# Patient Record
Sex: MALE | Race: WHITE | Employment: OTHER | URBAN - METROPOLITAN AREA
[De-identification: names, ages, dates, MRNs, and addresses within clinical notes are randomized per-mention and may not be internally consistent; named-entity substitution may affect disease eponyms.]

---

## 2018-08-21 ENCOUNTER — TELEPHONE (OUTPATIENT)
Dept: OTHER | Facility: CLINIC | Age: 33
End: 2018-08-21

## 2019-04-17 ENCOUNTER — OFFICE VISIT (OUTPATIENT)
Dept: FAMILY MEDICINE | Facility: CLINIC | Age: 34
End: 2019-04-17
Payer: COMMERCIAL

## 2019-04-17 VITALS
TEMPERATURE: 97.7 F | WEIGHT: 239 LBS | DIASTOLIC BLOOD PRESSURE: 84 MMHG | OXYGEN SATURATION: 97 % | SYSTOLIC BLOOD PRESSURE: 129 MMHG | BODY MASS INDEX: 32.37 KG/M2 | HEART RATE: 81 BPM | HEIGHT: 72 IN | RESPIRATION RATE: 16 BRPM

## 2019-04-17 DIAGNOSIS — Z76.89 ENCOUNTER TO ESTABLISH CARE: ICD-10-CM

## 2019-04-17 DIAGNOSIS — Z20.2 POTENTIAL EXPOSURE TO STD: Primary | ICD-10-CM

## 2019-04-17 RX ORDER — TACROLIMUS 1 MG/G
OINTMENT TOPICAL 2 TIMES DAILY
COMMUNITY
End: 2019-09-05

## 2019-04-17 SDOH — HEALTH STABILITY: MENTAL HEALTH: HOW OFTEN DO YOU HAVE 6 OR MORE DRINKS ON ONE OCCASION?: NEVER

## 2019-04-17 SDOH — HEALTH STABILITY: MENTAL HEALTH: HOW MANY DRINKS CONTAINING ALCOHOL DO YOU HAVE ON A TYPICAL DAY WHEN YOU ARE DRINKING?: 1 OR 2

## 2019-04-17 SDOH — HEALTH STABILITY: MENTAL HEALTH: HOW OFTEN DO YOU HAVE SIX OR MORE DRINKS ON ONE OCCASION?: NEVER

## 2019-04-17 SDOH — HEALTH STABILITY: MENTAL HEALTH: HOW OFTEN DO YOU HAVE A DRINK CONTAINING ALCOHOL?: MONTHLY OR LESS

## 2019-04-17 SDOH — HEALTH STABILITY: MENTAL HEALTH: HOW MANY STANDARD DRINKS CONTAINING ALCOHOL DO YOU HAVE ON A TYPICAL DAY?: 1 OR 2

## 2019-04-17 ASSESSMENT — ANXIETY QUESTIONNAIRES
1. FEELING NERVOUS, ANXIOUS, OR ON EDGE: NOT AT ALL
IF YOU CHECKED OFF ANY PROBLEMS ON THIS QUESTIONNAIRE, HOW DIFFICULT HAVE THESE PROBLEMS MADE IT FOR YOU TO DO YOUR WORK, TAKE CARE OF THINGS AT HOME, OR GET ALONG WITH OTHER PEOPLE: NOT DIFFICULT AT ALL
7. FEELING AFRAID AS IF SOMETHING AWFUL MIGHT HAPPEN: NOT AT ALL
GAD7 TOTAL SCORE: 0
3. WORRYING TOO MUCH ABOUT DIFFERENT THINGS: NOT AT ALL
6. BECOMING EASILY ANNOYED OR IRRITABLE: NOT AT ALL
2. NOT BEING ABLE TO STOP OR CONTROL WORRYING: NOT AT ALL
5. BEING SO RESTLESS THAT IT IS HARD TO SIT STILL: NOT AT ALL

## 2019-04-17 ASSESSMENT — PATIENT HEALTH QUESTIONNAIRE - PHQ9
5. POOR APPETITE OR OVEREATING: NOT AT ALL
SUM OF ALL RESPONSES TO PHQ QUESTIONS 1-9: 3

## 2019-04-17 ASSESSMENT — MIFFLIN-ST. JEOR: SCORE: 2063.93

## 2019-04-17 NOTE — NURSING NOTE
"33 year old  Chief Complaint   Patient presents with     Establish Care     Pt. presents to the clinic today to establish care / STD testing       Blood pressure 129/84, pulse 81, temperature 97.7  F (36.5  C), temperature source Oral, resp. rate 16, height 1.824 m (5' 11.8\"), weight 108.4 kg (239 lb), SpO2 97 %. Body mass index is 32.6 kg/m .  BP completed using cuff size:    There is no problem list on file for this patient.      Wt Readings from Last 2 Encounters:   04/17/19 108.4 kg (239 lb)     BP Readings from Last 3 Encounters:   04/17/19 129/84       Allergies   Allergen Reactions     Intal [Cromolyn]        Current Outpatient Medications   Medication     tacrolimus (PROTOPIC) 0.03 % external ointment     No current facility-administered medications for this visit.        Social History     Tobacco Use     Smoking status: Never Smoker     Smokeless tobacco: Never Used   Substance Use Topics     Alcohol use: Yes     Frequency: Monthly or less     Drinks per session: 1 or 2     Binge frequency: Never     Drug use: Not Currently     Types: Marijuana     Comment: very seldom       Honoring Choices - Health Care Directive Guide offered to patient at time of visit.    Health Maintenance Due   Topic Date Due     PREVENTIVE CARE VISIT  1985     HIV SCREEN (SYSTEM ASSIGNED)  10/22/2003     DTAP/TDAP/TD IMMUNIZATION (1 - Tdap) 10/22/2010     INFLUENZA VACCINE (1) 09/01/2018     PHQ-2  01/01/2019         There is no immunization history on file for this patient.    No results found for: PAP      No lab results found.    PHQ-2 ( 1999 Pfizer) 4/17/2019   Q1: Little interest or pleasure in doing things 0   Q2: Feeling down, depressed or hopeless 0   PHQ-2 Score 0       PHQ-9 SCORE 4/17/2019   PHQ-9 Total Score 3       IAN-7 SCORE 4/17/2019   Total Score 0       No flowsheet data found.    Rosanne Armendariz CMA  April 17, 2019 11:18 AM    "

## 2019-04-18 LAB
C TRACH DNA SPEC QL NAA+PROBE: NEGATIVE
HIV 1+2 AB+HIV1 P24 AG SERPL QL IA: NONREACTIVE
N GONORRHOEA DNA SPEC QL NAA+PROBE: NEGATIVE
SPECIMEN SOURCE: NORMAL
SPECIMEN SOURCE: NORMAL
T PALLIDUM AB SER QL: NONREACTIVE

## 2019-04-18 ASSESSMENT — ANXIETY QUESTIONNAIRES: GAD7 TOTAL SCORE: 0

## 2019-06-24 DIAGNOSIS — L98.9 SKIN LESION: Primary | ICD-10-CM

## 2019-09-04 ENCOUNTER — OFFICE VISIT (OUTPATIENT)
Dept: FAMILY MEDICINE | Facility: CLINIC | Age: 34
End: 2019-09-04
Payer: COMMERCIAL

## 2019-09-04 VITALS
RESPIRATION RATE: 16 BRPM | TEMPERATURE: 97.4 F | HEART RATE: 76 BPM | HEIGHT: 72 IN | WEIGHT: 246 LBS | OXYGEN SATURATION: 98 % | SYSTOLIC BLOOD PRESSURE: 141 MMHG | DIASTOLIC BLOOD PRESSURE: 84 MMHG | BODY MASS INDEX: 33.32 KG/M2

## 2019-09-04 DIAGNOSIS — L98.9 SKIN LESION: ICD-10-CM

## 2019-09-04 DIAGNOSIS — Z20.2 POTENTIAL EXPOSURE TO STD: Primary | ICD-10-CM

## 2019-09-04 ASSESSMENT — MIFFLIN-ST. JEOR: SCORE: 2098.85

## 2019-09-04 NOTE — NURSING NOTE
33 year old  Chief Complaint   Patient presents with     STD     Pt. presents to the clinic today requesting STD testing.        Blood pressure (!) 141/84, pulse 76, temperature 97.4  F (36.3  C), temperature source Oral, resp. rate 16, height 1.829 m (6'), weight 111.6 kg (246 lb), SpO2 98 %. Body mass index is 33.36 kg/m .  BP completed using cuff size:    There is no problem list on file for this patient.      Wt Readings from Last 2 Encounters:   09/04/19 111.6 kg (246 lb)   04/17/19 108.4 kg (239 lb)     BP Readings from Last 3 Encounters:   09/04/19 (!) 141/84   04/17/19 129/84       Allergies   Allergen Reactions     Intal [Cromolyn]        Current Outpatient Medications   Medication     tacrolimus (PROTOPIC) 0.1 % external ointment     No current facility-administered medications for this visit.        Social History     Tobacco Use     Smoking status: Never Smoker     Smokeless tobacco: Never Used   Substance Use Topics     Alcohol use: Yes     Frequency: Monthly or less     Drinks per session: 1 or 2     Binge frequency: Never     Comment: maybe twice alcohol     Drug use: Not Currently     Types: Marijuana     Comment: very seldom       Honoring Choices - Health Care Directive Guide offered to patient at time of visit.    Health Maintenance Due   Topic Date Due     PREVENTIVE CARE VISIT  1985     DTAP/TDAP/TD IMMUNIZATION (1 - Tdap) 10/22/2010     INFLUENZA VACCINE (1) 09/01/2019         There is no immunization history on file for this patient.    No results found for: PAP      No lab results found.    PHQ-2 ( 1999 Pfizer) 4/17/2019   Q1: Little interest or pleasure in doing things 0   Q2: Feeling down, depressed or hopeless 0   PHQ-2 Score 0       PHQ-9 SCORE 4/17/2019   PHQ-9 Total Score 3       IAN-7 SCORE 4/17/2019   Total Score 0       No flowsheet data found.    Rosanne Armendariz CMA  September 4, 2019 10:24 AM

## 2019-09-04 NOTE — PROGRESS NOTES
Moy Capone is a 33 year old male who presents today to have several skin lesions evaluated.  Moy has been traveling a lot over the past month, he noticed lesions on 2 fingers after this travel.  He did have a sexual encounter without penetration with a partner in [place].  This is someone who was unknown to him but he is still in contact with him.    Moy also was in a cooking contest in [place], cut his right index finger and then noticed a blister-type lesion as it was healing.  He was concerned about a virus or wart situation.  He also had a lesion on his left 4th finger that he was concerned about, that just popped up in the past week.  He has used an OTC wart remover product on these 2 places but also on his penis, where he has had tiny white spots, even before travel and potential exposure.    Review Of Systems   ROS: 10 point ROS neg other than the symptoms noted above in the HPI.    Past Medical History:   Diagnosis Date     Eczema      Past Surgical History:   Procedure Laterality Date     wisdom teeth       Social History     Socioeconomic History     Marital status: Single     Spouse name: Not on file     Number of children: Not on file     Years of education: Not on file     Highest education level: Not on file   Occupational History     Not on file   Social Needs     Financial resource strain: Not on file     Food insecurity:     Worry: Not on file     Inability: Not on file     Transportation needs:     Medical: Not on file     Non-medical: Not on file   Tobacco Use     Smoking status: Never Smoker     Smokeless tobacco: Never Used   Substance and Sexual Activity     Alcohol use: Yes     Frequency: Monthly or less     Drinks per session: 1 or 2     Binge frequency: Never     Comment: maybe twice alcohol     Drug use: Not Currently     Types: Marijuana     Comment: very seldom     Sexual activity: Yes     Partners: Male   Lifestyle     Physical activity:     Days per week: Not on file     Minutes per  session: Not on file     Stress: Not on file   Relationships     Social connections:     Talks on phone: Not on file     Gets together: Not on file     Attends Anabaptism service: Not on file     Active member of club or organization: Not on file     Attends meetings of clubs or organizations: Not on file     Relationship status: Not on file     Intimate partner violence:     Fear of current or ex partner: Not on file     Emotionally abused: Not on file     Physically abused: Not on file     Forced sexual activity: Not on file   Other Topics Concern     Not on file   Social History Narrative     Not on file     Family History   Problem Relation Age of Onset     Hypertension Father      Obesity Father      Alzheimer Disease Maternal Grandfather        BP (!) 141/84 (BP Location: Left arm, Patient Position: Chair, Cuff Size: Adult Large)   Pulse 76   Temp 97.4  F (36.3  C) (Oral)   Resp 16   Ht 1.829 m (6')   Wt 111.6 kg (246 lb)   SpO2 98%   BMI 33.36 kg/m      Exam:  Constitutional: healthy, alert and mild distress  Skin: Right index finger:  Small white lesion, tip of finger, after scraped with #15 blade, unable to see a wart or root.  Left 4th digit, area where OTC med left redness, no wart appearance.  Milia on penis.    Psychiatric: mentation appears normal and affect normal/bright    Assessment/Plan:  1. Potential exposure to STD    - HIV Antigen Antibody Combo  - Treponema Abs w Reflex to RPR and Titer  - NEISSERIA GONORRHOEA PCR  - CHLAMYDIA TRACHOMATIS PCR    2. Skin lesions    RTC in one week if any lesion appear abnormal after affect of OTC med done.

## 2019-09-05 DIAGNOSIS — L65.9 HAIR LOSS: Primary | ICD-10-CM

## 2019-09-05 RX ORDER — TACROLIMUS 1 MG/G
OINTMENT TOPICAL 2 TIMES DAILY
Qty: 60 G | Refills: 3 | Status: SHIPPED | OUTPATIENT
Start: 2019-09-05 | End: 2020-10-01

## 2019-09-27 ENCOUNTER — OFFICE VISIT (OUTPATIENT)
Dept: FAMILY MEDICINE | Facility: CLINIC | Age: 34
End: 2019-09-27
Payer: COMMERCIAL

## 2019-09-27 VITALS
HEIGHT: 72 IN | BODY MASS INDEX: 33.06 KG/M2 | WEIGHT: 244.1 LBS | OXYGEN SATURATION: 99 % | SYSTOLIC BLOOD PRESSURE: 125 MMHG | TEMPERATURE: 98.5 F | HEART RATE: 77 BPM | DIASTOLIC BLOOD PRESSURE: 84 MMHG

## 2019-09-27 DIAGNOSIS — B07.9 VIRAL WARTS, UNSPECIFIED TYPE: Primary | ICD-10-CM

## 2019-09-27 ASSESSMENT — PATIENT HEALTH QUESTIONNAIRE - PHQ9
SUM OF ALL RESPONSES TO PHQ QUESTIONS 1-9: 1
5. POOR APPETITE OR OVEREATING: NOT AT ALL

## 2019-09-27 ASSESSMENT — ENCOUNTER SYMPTOMS: FEVER: 0

## 2019-09-27 ASSESSMENT — ANXIETY QUESTIONNAIRES
5. BEING SO RESTLESS THAT IT IS HARD TO SIT STILL: NOT AT ALL
2. NOT BEING ABLE TO STOP OR CONTROL WORRYING: NOT AT ALL
3. WORRYING TOO MUCH ABOUT DIFFERENT THINGS: NOT AT ALL
GAD7 TOTAL SCORE: 0
7. FEELING AFRAID AS IF SOMETHING AWFUL MIGHT HAPPEN: NOT AT ALL
1. FEELING NERVOUS, ANXIOUS, OR ON EDGE: NOT AT ALL
IF YOU CHECKED OFF ANY PROBLEMS ON THIS QUESTIONNAIRE, HOW DIFFICULT HAVE THESE PROBLEMS MADE IT FOR YOU TO DO YOUR WORK, TAKE CARE OF THINGS AT HOME, OR GET ALONG WITH OTHER PEOPLE: NOT DIFFICULT AT ALL
6. BECOMING EASILY ANNOYED OR IRRITABLE: NOT AT ALL

## 2019-09-27 ASSESSMENT — MIFFLIN-ST. JEOR: SCORE: 2088.64

## 2019-09-27 NOTE — PATIENT INSTRUCTIONS
After Cryotherapy      The treated area will become red soon after your procedure. It also may blister and swell. If this happens, don t break open the blister.     You may also see clear drainage on the treated area. This is normal. The treated area will heal in about 7 to 10 days. It will probably not leave a scar.    Caring for yourself after cryotherapy    Starting the day after your procedure, wash the treated area gently with fragrance-free soap and water daily.    Leave the treated area uncovered. Ifyou have any drainainge, you can cover the area with a bandage (Band-Aid ).    If the treated area develops a crust, you can apply petroleum jelly (Vaseline ) on until the crust falls off.    If you have any bleeding, press firmly on the area with a clean gauze pad for 15 minutes. If the bleeding doesn t stop, repeat this step. If the bleeding still hasn t stopped after repeating this step, call your doctor s office.    Don t use scented soap, makeup, or lotion on the treated area until it has healed. This will usually be at least 10 days after your procedure.    You may lose some hair on the treated area. This depends on how deep the freezing went. The hair loss may be permanent.    Once the treated area has healed, apply a broad-spectrum sunscreen with an SPF of at least 30 to the area to protect it from scarring.    You may have discoloration (pinkness, redness, or lighter or darker skin) at the treated area for up to 1 year after your procedure. Some people may have it for even longer or it may be permanent.    Call Your Doctor or Nurse if You Have:    A temperature of 100.4  F (38  C) or higher    Chills    Any of the following symptoms at or around the treated area:    Redness or swelling that extends to areas of untreated skin    Increasing pain or discomfort in the treated area    Skin in the treated area that s hot or hard to the touch    Increasing oozing, or drainage (yellow or green) from the treated  area    A bad smell    Bleeding that doesn t stop after applying pressure    Any questions or concerns    Any problems you didn t expect

## 2019-09-27 NOTE — PROGRESS NOTES
"       MEMO       Moy Capone is a 33 year old male with a past medical history significant for eczema who presents for     Chief Complaint   Patient presents with     Derm Problem     Warts, noticed mid-August, used wart remover seemed to help,      Refill Request     Darion Winter presented to clinic about three weeks ago at which time he reported a three week history of skin lesion on his right index finger and left 4th finger. He was concerned about the possibility of wart or viral process and began using OTC liquid wart remover on the lesions twice daily for about 9-10 days. At the time of his office visit three weeks ago he was advised by the provider he saw to stop using the OTC wart remover and observe the lesions as they heal. The lesions have now fully healed and he suspects he still has a wart on his right index finger. He denies bleeding, drainage, or irritation. He has never previously have cryotherapy performed on the lesion. Although it is small, he would prefer to have the lesion on his right index finger treated today in clinic to avoid is growing or spreading to alternate areas.      Past Medical History:   Diagnosis Date     Eczema      Current Outpatient Medications   Medication     tacrolimus (PROTOPIC) 0.1 % external ointment     No current facility-administered medications for this visit.         Allergies   Allergen Reactions     Intal [Cromolyn]        Problem, Medication and Allergy Lists were reviewed and updated if needed..    Patient is an established patient of this clinic..         Review of Systems:   Review of Systems   Constitutional: Negative for fever.   Skin:        Possible wart on right index finger.          Physical Exam:     Vitals:    09/27/19 0934   BP: 125/84   Pulse: 77   Temp: 98.5  F (36.9  C)   TempSrc: Oral   SpO2: 99%   Weight: 110.7 kg (244 lb 1.6 oz)   Height: 1.826 m (5' 11.9\")     Body mass index is 33.2 kg/m .  Vital signs normal except elevated diastolic " blood pressure.      Physical Exam  Constitutional:       General: He is not in acute distress.     Appearance: Normal appearance. He is not ill-appearing.   Skin:     General: Skin is warm and dry.      Comments: One small somewhat raised verrucous lesion noted to right index finger, ~2 mm in diameter. Lesion is without bleeding, skin flaking, or drainage.    Neurological:      General: No focal deficit present.      Mental Status: He is alert and oriented to person, place, and time.   Psychiatric:         Mood and Affect: Mood normal.         Behavior: Behavior normal.         Results:     No laboratory testing was completed at today's visit.    Assessment and Plan      1. Viral warts, unspecified type  Comment: Pt with 1.5 month hx of verrucous lesion to right index finger. Appearance is improved after twice daily use of OTC liquid wart treatment x 9-10 days, however lesion remains present and pt is requesting to have cryotherapy performed in order to avoid future lesion growth or spread.  Plan:   - DESTRUCT BENIGN LESION, UP TO 14    Consent:  Risks and benefits of therapy discussed with patient who voices understanding and agrees with planned care. No barriers to communication or understanding identified.  Procedure: After obtaining verbal informed consent, cryotherapy with liquid nitrogen was applied, 4 treatments of 5 seconds each applied to both warts. Frost ring obtained. Pt tolerated the procedure well. Education:  Aftercare, including blister formation, risks of bleeding, and risks of recurrence were discussed. All questions answered.  Return for retreatment in 2 weeks.    Follow-up: Return to clinic if symptoms fail to improve, worsen, or new symptoms develop with the above treatment plan.      There are no discontinued medications.    Options for treatment and follow-up care were reviewed with the patient. Moy Capone  engaged in the decision making process and verbalized understanding of the options  discussed and agreed with the final plan.    FAITH Hurtado CNP

## 2019-09-27 NOTE — NURSING NOTE
"33 year old  Chief Complaint   Patient presents with     Derm Problem     Warts, noticed mid-August, used wart remover seemed to help,      Refill Request     Protopic       Blood pressure 125/84, pulse 77, temperature 98.5  F (36.9  C), temperature source Oral, height 1.826 m (5' 11.9\"), weight 110.7 kg (244 lb 1.6 oz), SpO2 99 %. Body mass index is 33.2 kg/m .  BP completed using cuff size:    There is no problem list on file for this patient.      Wt Readings from Last 2 Encounters:   09/27/19 110.7 kg (244 lb 1.6 oz)   09/04/19 111.6 kg (246 lb)     BP Readings from Last 3 Encounters:   09/27/19 125/84   09/04/19 (!) 141/84   04/17/19 129/84       Allergies   Allergen Reactions     Intal [Cromolyn]        Current Outpatient Medications   Medication     tacrolimus (PROTOPIC) 0.1 % external ointment     No current facility-administered medications for this visit.        Social History     Tobacco Use     Smoking status: Never Smoker     Smokeless tobacco: Never Used   Substance Use Topics     Alcohol use: Yes     Frequency: Monthly or less     Drinks per session: 1 or 2     Binge frequency: Never     Comment: maybe twice alcohol     Drug use: Not Currently     Types: Marijuana     Comment: very seldom       Honoring Choices - Health Care Directive Guide offered to patient at time of visit.    Health Maintenance Due   Topic Date Due     PREVENTIVE CARE VISIT  1985     DTAP/TDAP/TD IMMUNIZATION (1 - Tdap) 10/22/2010     INFLUENZA VACCINE (1) 09/01/2019         There is no immunization history on file for this patient.    No results found for: PAP      No lab results found.    PHQ-2 ( 1999 Pfizer) 4/17/2019   Q1: Little interest or pleasure in doing things 0   Q2: Feeling down, depressed or hopeless 0   PHQ-2 Score 0       PHQ-9 SCORE 4/17/2019 9/27/2019   PHQ-9 Total Score 3 1       IAN-7 SCORE 4/17/2019 9/27/2019   Total Score 0 0       No flowsheet data found.      Deanna Shields, [facility]  September 27, 2019 " 9:36 AM

## 2019-09-28 ASSESSMENT — ANXIETY QUESTIONNAIRES: GAD7 TOTAL SCORE: 0

## 2019-10-16 ENCOUNTER — TELEPHONE (OUTPATIENT)
Dept: PHARMACY | Facility: CLINIC | Age: 34
End: 2019-10-16

## 2019-10-16 NOTE — TELEPHONE ENCOUNTER
Called patient to follow up on patient's tacrolimus PA denial.    [place] from patient that he has tried about 5 different topical steroid products in the past for his excema which have nto worked as well as tacrolimus.  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    Will discuss with PA team about submitting an updated PA.

## 2019-10-21 NOTE — TELEPHONE ENCOUNTER
Central Prior Authorization Team   Phone: [phone]      Because it has already been denied. We could try to appeal with the updated information, with a Letter of Medical Necessity.

## 2019-11-01 ENCOUNTER — OFFICE VISIT (OUTPATIENT)
Dept: FAMILY MEDICINE | Facility: CLINIC | Age: 34
End: 2019-11-01
Payer: COMMERCIAL

## 2019-11-01 ENCOUNTER — MYC MEDICAL ADVICE (OUTPATIENT)
Dept: FAMILY MEDICINE | Facility: CLINIC | Age: 34
End: 2019-11-01

## 2019-11-01 VITALS
BODY MASS INDEX: 32.23 KG/M2 | HEART RATE: 77 BPM | RESPIRATION RATE: 16 BRPM | SYSTOLIC BLOOD PRESSURE: 125 MMHG | HEIGHT: 72 IN | TEMPERATURE: 98.7 F | OXYGEN SATURATION: 98 % | DIASTOLIC BLOOD PRESSURE: 84 MMHG | WEIGHT: 238 LBS

## 2019-11-01 DIAGNOSIS — Z11.3 ROUTINE SCREENING FOR STI (SEXUALLY TRANSMITTED INFECTION): ICD-10-CM

## 2019-11-01 DIAGNOSIS — N48.9 PENILE LESION: Primary | ICD-10-CM

## 2019-11-01 ASSESSMENT — ENCOUNTER SYMPTOMS
DIFFICULTY URINATING: 0
WOUND: 0
FREQUENCY: 0
DYSURIA: 0

## 2019-11-01 ASSESSMENT — MIFFLIN-ST. JEOR: SCORE: 2049.62

## 2019-11-01 NOTE — NURSING NOTE
"34 year old  Chief Complaint   Patient presents with     STD     Pt. presents to the clinic today for STI testing. Possible genital warts.        Blood pressure 125/84, pulse 77, temperature 98.7  F (37.1  C), temperature source Oral, resp. rate 16, height 1.816 m (5' 11.5\"), weight 108 kg (238 lb), SpO2 98 %. Body mass index is 32.73 kg/m .  BP completed using cuff size:    There is no problem list on file for this patient.      Wt Readings from Last 2 Encounters:   11/01/19 108 kg (238 lb)   09/27/19 110.7 kg (244 lb 1.6 oz)     BP Readings from Last 3 Encounters:   11/01/19 125/84   09/27/19 125/84   09/04/19 (!) 141/84       Allergies   Allergen Reactions     Intal [Cromolyn]        Current Outpatient Medications   Medication     tacrolimus (PROTOPIC) 0.1 % external ointment     No current facility-administered medications for this visit.        Social History     Tobacco Use     Smoking status: Never Smoker     Smokeless tobacco: Never Used   Substance Use Topics     Alcohol use: Yes     Frequency: Monthly or less     Drinks per session: 1 or 2     Binge frequency: Never     Comment: maybe twice alcohol     Drug use: Not Currently     Types: Marijuana     Comment: very seldom       Honoring Choices - Health Care Directive Guide offered to patient at time of visit.    Health Maintenance Due   Topic Date Due     PREVENTIVE CARE VISIT  1985     DTAP/TDAP/TD IMMUNIZATION (1 - Tdap) 10/22/2010     INFLUENZA VACCINE (1) 09/01/2019         There is no immunization history on file for this patient.    No results found for: PAP      No lab results found.    PHQ-2 ( 1999 Pfizer) 4/17/2019   Q1: Little interest or pleasure in doing things 0   Q2: Feeling down, depressed or hopeless 0   PHQ-2 Score 0       PHQ-9 SCORE 4/17/2019 9/27/2019   PHQ-9 Total Score 3 1       IAN-7 SCORE 4/17/2019 9/27/2019   Total Score 0 0       No flowsheet data found.    Rosanne Armendariz CMA  November 1, 2019 12:55 PM    "

## 2019-11-01 NOTE — PROGRESS NOTES
HPI       Moy Capone is a 34 year old male with a past medical history significant for eczema who presents for     Chief Complaint   Patient presents with     STD     Pt. presents to the clinic today for STI testing. Possible genital warts.      Moy reports a one day history of penile lesion. Reports he woke up in the middle of the night two nights ago and itched his groin. Upon itching, he noted a bump. He shot up out of bed and went to the bathroom to assess the bump. He noted two lesions at that time, one to the right of penile shaft base and one to the left. The following day he noted an additional bump on the left side. By the end of the day yesterday, both bumps on the left side had developed a dark spot on the top. He denies pain, itching, drainage, burning, or redness. Patient recently obtained a refill of tacrolimus, which he uses to manage his eczema. He admits to applying tacrolimus to the penile lesions once, didn't notice a major influence on his symptoms. He has tried no other therapies to manage his symptoms.     Moy was last screened for STI (gonorrhea, chlamydia, RPR, HIV) on 9/4/19, all results were negative. Since that time he has had two sexual partners, one of which was a new partner. He recently started dating a new partner but they have not yet been sexually active. The patient is only sexually active with men. Patient denies history of STI.       Past Medical History:   Diagnosis Date     Eczema      Current Outpatient Medications   Medication     tacrolimus (PROTOPIC) 0.1 % external ointment     No current facility-administered medications for this visit.         Allergies   Allergen Reactions     Intal [Cromolyn]        Problem, Medication and Allergy Lists were reviewed and updated if needed..    Patient is an established patient of this clinic..         Review of Systems:   Review of Systems   Genitourinary: Positive for genital sores. Negative for decreased urine volume,  "difficulty urinating, discharge, dysuria, frequency, penile pain, penile swelling, scrotal swelling, testicular pain and urgency.   Skin: Negative for wound.        Three bumps near penis.          Physical Exam:     Vitals:    11/01/19 1251   BP: 125/84   BP Location: Left arm   Patient Position: Chair   Cuff Size: Adult Large   Pulse: 77   Resp: 16   Temp: 98.7  F (37.1  C)   TempSrc: Oral   SpO2: 98%   Weight: 108 kg (238 lb)   Height: 1.816 m (5' 11.5\")     Body mass index is 32.73 kg/m .  Vitals were reviewed and were normal.     Physical Exam  Constitutional:       General: He is not in acute distress.     Appearance: Normal appearance. He is not ill-appearing or toxic-appearing.   Cardiovascular:      Rate and Rhythm: Normal rate.   Pulmonary:      Effort: Pulmonary effort is normal. No respiratory distress.   Genitourinary:      Skin:     General: Skin is warm and dry.   Neurological:      General: No focal deficit present.      Mental Status: He is alert and oriented to person, place, and time.   Psychiatric:         Mood and Affect: Mood normal.         Behavior: Behavior normal.           Results:     Laboratory results pending:  HIV Antigen Antibody Combo  Hepatitis C Antibody  Hepatitis B surface antigen  Hepatitis B surface antibody  Chlamydia trachomatis PCR  Neisseria gonorrhoea PCR  Treponema Abs e Reflex to RPR and Titer  HSV 1 and 2 DNA by PCR    Assessment and Plan      1. Penile lesion  Comment: Pt with 1 day hx of penile lesions. On exam, one of the penile lesions appears suspicious for HSV, culture obtained and is pending. The other three lesions appear as light pink papules without drainage, crusting, erythema, or swelling, one lesion with scabbing noted. Appearance of these lesions is potentially consistent with genital warts, however it is unclear at this time. Ddx include HSV-1/2, genital warts, folliculitis, dermatitis. Recommend awaiting HSV culture and monitoring appearance of all " lesions over the next 2-3 days. Given the quickly changing appearance of the lesions over the last 24 hours, recommend watchful waiting for 2-3 days prior to treating the lesions.   Plan:   - Hepatitis C antibody   - Hepatitis B Surface Antibody   - Hepatitis B surface antigen   - NEISSERIA GONORRHOEA PCR   - CHLAMYDIA TRACHOMATIS PCR   - Treponema Abs w Reflex to RPR and Titer   - HIV Antigen Antibody Combo   - HSV 1 and 2 DNA by PCR   - Check in with patient in 2-3 days    2. Routine screening for STI (sexually transmitted infection)  Comment: Pt last screened for STI (gonorrhea, chlamydia, RPR, HIV) on 9/4/19, all results were negative. Since that time he has had two sexual partners, one of which was a new partner. He recently started dating a new partner but they have not yet been sexually active. The patient is only sexually active with men. Patient denies history of STI.   Plan:   - Hepatitis C antibody   - Hepatitis B Surface Antibody   - Hepatitis B surface antigen   - NEISSERIA GONORRHOEA PCR   - CHLAMYDIA TRACHOMATIS PCR   - Treponema Abs w Reflex to RPR and Titer   - HIV Antigen Antibody Combo   - HSV 1 and 2 DNA by PCR      Follow-up: Lab results pending, will follow-up as results indicate. Plan to call patient to check in on his symptoms in 2-3 days. Will continue discussion surrounding a treatment plan at that time.       There are no discontinued medications.    Options for treatment and follow-up care were reviewed with the patient. Moy Capone  engaged in the decision making process and verbalized understanding of the options discussed and agreed with the final plan.    FAITH Hurtado CNP

## 2019-11-02 LAB
HSV1 DNA SPEC QL NAA+PROBE: POSITIVE
HSV2 DNA SPEC QL NAA+PROBE: NEGATIVE
SPECIMEN SOURCE: ABNORMAL
T PALLIDUM AB SER QL: NONREACTIVE

## 2019-11-03 LAB
C TRACH DNA SPEC QL NAA+PROBE: NEGATIVE
N GONORRHOEA DNA SPEC QL NAA+PROBE: NEGATIVE
SPECIMEN SOURCE: NORMAL
SPECIMEN SOURCE: NORMAL

## 2019-11-04 ENCOUNTER — TELEPHONE (OUTPATIENT)
Dept: FAMILY MEDICINE | Facility: CLINIC | Age: 34
End: 2019-11-04

## 2019-11-04 DIAGNOSIS — B00.9 HSV-1 (HERPES SIMPLEX VIRUS 1) INFECTION: Primary | ICD-10-CM

## 2019-11-04 LAB
HBV SURFACE AB SERPL IA-ACNC: 4.45 M[IU]/ML
HBV SURFACE AG SERPL QL IA: NONREACTIVE
HCV AB SERPL QL IA: NONREACTIVE
HIV 1+2 AB+HIV1 P24 AG SERPL QL IA: NONREACTIVE

## 2019-11-04 RX ORDER — VALACYCLOVIR HYDROCHLORIDE 500 MG/1
500 TABLET, FILM COATED ORAL 2 TIMES DAILY
Qty: 6 TABLET | Refills: 3 | Status: SHIPPED | OUTPATIENT
Start: 2019-11-04 | End: 2020-09-28

## 2019-11-04 RX ORDER — VALACYCLOVIR HYDROCHLORIDE 1 G/1
1000 TABLET, FILM COATED ORAL 2 TIMES DAILY
Qty: 20 TABLET | Refills: 0 | Status: SHIPPED | OUTPATIENT
Start: 2019-11-04 | End: 2020-09-28

## 2019-11-25 ENCOUNTER — OFFICE VISIT (OUTPATIENT)
Dept: DERMATOLOGY | Facility: CLINIC | Age: 34
End: 2019-11-25
Payer: COMMERCIAL

## 2019-11-25 VITALS — DIASTOLIC BLOOD PRESSURE: 83 MMHG | HEART RATE: 68 BPM | SYSTOLIC BLOOD PRESSURE: 135 MMHG

## 2019-11-25 DIAGNOSIS — L40.9 PSORIASIS: Primary | ICD-10-CM

## 2019-11-25 DIAGNOSIS — L21.9 DERMATITIS, SEBORRHEIC: ICD-10-CM

## 2019-11-25 RX ORDER — KETOCONAZOLE 20 MG/ML
SHAMPOO TOPICAL
Qty: 120 ML | Refills: 11 | Status: SHIPPED | OUTPATIENT
Start: 2019-11-25 | End: 2023-08-22

## 2019-11-25 RX ORDER — FLUOCINOLONE ACETONIDE 0.11 MG/ML
OIL TOPICAL
Qty: 118.28 ML | Refills: 6 | Status: SHIPPED | OUTPATIENT
Start: 2019-11-25 | End: 2023-08-22

## 2019-11-25 RX ORDER — FLUOCINONIDE TOPICAL SOLUTION USP, 0.05% 0.5 MG/ML
SOLUTION TOPICAL
Qty: 60 ML | Refills: 6 | Status: SHIPPED | OUTPATIENT
Start: 2019-11-25 | End: 2023-08-22

## 2019-11-25 RX ORDER — TRIAMCINOLONE ACETONIDE 1 MG/G
OINTMENT TOPICAL 2 TIMES DAILY
Qty: 80 G | Refills: 3 | Status: SHIPPED | OUTPATIENT
Start: 2019-11-25 | End: 2023-08-22

## 2019-11-25 ASSESSMENT — PAIN SCALES - GENERAL: PAINLEVEL: NO PAIN (0)

## 2019-11-25 NOTE — PROGRESS NOTES
[facility] Dermatology Note      Dermatology Problem List:  1. Psoriasiform dermatitis, scalp and body  - triamcinolone 0.1% ointment BID PRN body  - tacrolimus 0.1% ointment BID PRN face  - fluocinolone oil/fluocinonide solution to scalp BID PRN  - ketoconazole 2% shampoo 2-3 times weekly    Encounter Date: Nov 25, 2019    CC:   Chief Complaint   Patient presents with     Derm Problem     Moy is here today for possible eczema. He says his skin has changed a lot and the creams are no longer working.       History of Present Illness:  Mr. Moy Capone is a 34 year old male who presents as a referral from Indu Brady NP for eczema. He has a longstanding history of eczema for many years. Was initially diagnosed about 10 years when he was prescribed tacrolimus ointment. His disease was quite minimal during this time. Recently started worsening about 2 months ago. Developed itchiness and redness of scalp as well as scaliness of the face. He reports bumps on the elbows that are not itchy. He has some scattered red scaly spots on the back and legs. He is concerned that this might be psoriasis. No joint pain. He bathes every other day with Dove bar soap. He has been moisturizing from head to toe with cooking grade coconut oil. He is a swimmer. Otherwise, feels well and no other skin concerns.      Past Medical History:   Patient Active Problem List   Diagnosis     Dermatitis, seborrheic     Past Medical History:   Diagnosis Date     Eczema      Past Surgical History:   Procedure Laterality Date     wisdom teeth         Social History:  Patient reports that he has never smoked. He has never used smokeless tobacco. He reports current alcohol use. He reports previous drug use. Drug: Marijuana.    Family History:  Family History   Problem Relation Age of Onset     Hypertension Father      Obesity Father      Alzheimer Disease Maternal Grandfather      Skin Cancer Maternal Grandfather      Melanoma No  family hx of        Medications:  Current Outpatient Medications   Medication Sig Dispense Refill     Fluocinolone Acetonide Scalp 0.01 % OIL oil Apply thin layer to scalp 1-2 times daily as needed for itchiness 118.28 mL 6     fluocinonide (LIDEX) 0.05 % external solution Apply 4-6 drops to scalp 1-2 times daily as needed for itchiness 60 mL 6     ketoconazole (NIZORAL) 2 % external shampoo Lather, apply to scalp, leave in for 5 minutes, and then wash out 2-3 times a week 120 mL 11     tacrolimus (PROTOPIC) 0.1 % external ointment Apply topically 2 times daily 60 g 3     triamcinolone (KENALOG) 0.1 % external ointment Apply topically 2 times daily To itchy areas on body 80 g 3     valACYclovir (VALTREX) 1000 mg tablet Take 1 tablet (1,000 mg) by mouth 2 times daily 20 tablet 0     valACYclovir (VALTREX) 500 MG tablet Take 1 tablet (500 mg) by mouth 2 times daily for 3 days 6 tablet 3        Allergies   Allergen Reactions     Intal [Cromolyn]          Review of Systems:  -As per HPI  -Constitutional: Otherwise feeling well today, in usual state of health.  -HEENT: Patient denies nonhealing oral sores.  -Skin: As above in HPI. No additional skin concerns.    Physical exam:  Vitals: /83 (BP Location: Right arm, Patient Position: Sitting, Cuff Size: Adult Regular)   Pulse 68   GEN: This is a well developed, well-nourished male in no acute distress, in a pleasant mood.    SKIN: Total skin excluding the undergarment areas was performed. The exam included the head/face, neck, both arms, chest, back, abdomen, both legs, digits and/or nails.   -Cerna skin type: II  -Scalp with diffuse macular erythema throughout with mild scaling.  -Left flank, right thigh, and posterior calves with well-demarcated small circular to avoid dark red scaly plaques.   -Left sole with thickening and scaling with a fissure on the left great toe.  -No other lesions of concern on areas examined.     Impression/Plan:  1. Psoriasiform  dermatitis: Given the distribution of the rash on the elbows and scalp, we suspect that this is more likely psoriasiform in nature as opposed to eczematous. 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 We discussed that biopsy would more definitively give a diagnosis but patient declined at this time. Either way, treatment would include topical steroids and topical calcineurin inhibitors for symptomatic control.     Start triamcinolone 0.1% ointment BID PRN to body    Continue protopic 0.1% ointment BID PRN to face    Encouraged gentle skin care    2. 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    Follow-up in 3 months, earlier for new or changing lesions.     Dr. Matute staffed the patient.    Staff Involved:  Resident(Vickie Paez)/Staff(as above)    Vickie Paez M.D.  PGY-4 Resident  Dermatology  [facility]    Staff Physician Comments:   I saw and evaluated the patient with the resident and I edited the assessment and plan as documented in the note. I was present for the examination.    Mitchell Matute MD   of Dermatology  Department of Dermatology  [facility] [employer]

## 2019-11-25 NOTE — NURSING NOTE
Dermatology Rooming Note    Moy Capone's goals for this visit include:   Chief Complaint   Patient presents with     Derm Problem     Moy is here today for possible eczema. He says his skin has changed a lot and the creams are no longer working.     Maureen Gonzalez, [facility]

## 2019-11-25 NOTE — LETTER
11/25/2019       RE: Moy Capone  [address]  [facility] [address]     Dear Colleague,    Thank you for referring your patient, Moy Capone, to the [facility] DERMATOLOGY at [facility]. Please see a copy of my visit note below.    [facility] Dermatology Note      Dermatology Problem List:  1. Psoriasiform dermatitis, scalp and body  - triamcinolone 0.1% ointment BID PRN body  - tacrolimus 0.1% ointment BID PRN face  - fluocinolone oil/fluocinonide solution to scalp BID PRN  - ketoconazole 2% shampoo 2-3 times weekly    Encounter Date: Nov 25, 2019    CC:   Chief Complaint   Patient presents with     Derm Problem     Moy is here today for possible eczema. He says his skin has changed a lot and the creams are no longer working.       History of Present Illness:  Mr. Moy Capone is a 34 year old male who presents as a referral from Indu Brady NP for eczema. He has a longstanding history of eczema for many years. Was initially diagnosed about 10 years when he was prescribed tacrolimus ointment. His disease was quite minimal during this time. Recently started worsening about 2 months ago. Developed itchiness and redness of scalp as well as scaliness of the face. He reports bumps on the elbows that are not itchy. He has some scattered red scaly spots on the back and legs. He is concerned that this might be psoriasis. No joint pain. He bathes every other day with Dove bar soap. He has been moisturizing from head to toe with cooking grade coconut oil. He is a swimmer. Otherwise, feels well and no other skin concerns.      Past Medical History:   Patient Active Problem List   Diagnosis     Dermatitis, seborrheic     Past Medical History:   Diagnosis Date     Eczema      Past Surgical History:   Procedure Laterality Date     wisdom teeth         Social History:  Patient reports that he has never smoked. He has never used smokeless tobacco. He  reports current alcohol use. He reports previous drug use. Drug: Marijuana.    Family History:  Family History   Problem Relation Age of Onset     Hypertension Father      Obesity Father      Alzheimer Disease Maternal Grandfather      Skin Cancer Maternal Grandfather      Melanoma No family hx of        Medications:  Current Outpatient Medications   Medication Sig Dispense Refill     Fluocinolone Acetonide Scalp 0.01 % OIL oil Apply thin layer to scalp 1-2 times daily as needed for itchiness 118.28 mL 6     fluocinonide (LIDEX) 0.05 % external solution Apply 4-6 drops to scalp 1-2 times daily as needed for itchiness 60 mL 6     ketoconazole (NIZORAL) 2 % external shampoo Lather, apply to scalp, leave in for 5 minutes, and then wash out 2-3 times a week 120 mL 11     tacrolimus (PROTOPIC) 0.1 % external ointment Apply topically 2 times daily 60 g 3     triamcinolone (KENALOG) 0.1 % external ointment Apply topically 2 times daily To itchy areas on body 80 g 3     valACYclovir (VALTREX) 1000 mg tablet Take 1 tablet (1,000 mg) by mouth 2 times daily 20 tablet 0     valACYclovir (VALTREX) 500 MG tablet Take 1 tablet (500 mg) by mouth 2 times daily for 3 days 6 tablet 3        Allergies   Allergen Reactions     Intal [Cromolyn]          Review of Systems:  -As per HPI  -Constitutional: Otherwise feeling well today, in usual state of health.  -HEENT: Patient denies nonhealing oral sores.  -Skin: As above in HPI. No additional skin concerns.    Physical exam:  Vitals: /83 (BP Location: Right arm, Patient Position: Sitting, Cuff Size: Adult Regular)   Pulse 68   GEN: This is a well developed, well-nourished male in no acute distress, in a pleasant mood.    SKIN: Total skin excluding the undergarment areas was performed. The exam included the head/face, neck, both arms, chest, back, abdomen, both legs, digits and/or nails.   -Cerna skin type: II  -Scalp with diffuse macular erythema throughout with mild  scaling.  -Left flank, right thigh, and posterior calves with well-demarcated small circular to avoid dark red scaly plaques.   -Left sole with thickening and scaling with a fissure on the left great toe.  -No other lesions of concern on areas examined.     Impression/Plan:  1. Psoriasiform dermatitis: Given the distribution of the rash on the elbows and scalp, we suspect that this is more likely psoriasiform in nature as opposed to eczematous. 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 We discussed that biopsy would more definitively give a diagnosis but patient declined at this time. Either way, treatment would include topical steroids and topical calcineurin inhibitors for symptomatic control.     Start triamcinolone 0.1% ointment BID PRN to body    Continue protopic 0.1% ointment BID PRN to face    Encouraged gentle skin care    2. Sebopsoriasis vs seborrheic dermatitis, scalp    Start lidex 0.05% solution 1-2 times daily for itchiness to scalp    Start dermasmoothe oil 1-2 times daily for itchiness to scalp    Start ketoconazole 2% shampoo 2-3 times weekly to scalp      GUI Brady, DORINA  [address] [address] on close of this encounter.    Follow-up in 3 months, earlier for new or changing lesions.     Dr. Matute staffed the patient.    Staff Involved:  Resident(Vickie Paez)/Staff(as above)    Vickie Paez M.D.  PGY-4 Resident  Dermatology  [facility]    Staff Physician Comments:   I saw and evaluated the patient with the resident and I edited the assessment and plan as documented in the note. I was present for the examination.    Mitchell Matute MD   of Dermatology  Department of Dermatology  [facility] [employer]

## 2019-11-27 PROBLEM — L21.9 DERMATITIS, SEBORRHEIC: Status: ACTIVE | Noted: 2019-11-27

## 2020-01-22 DIAGNOSIS — Z11.3 ROUTINE SCREENING FOR STI (SEXUALLY TRANSMITTED INFECTION): Primary | ICD-10-CM

## 2020-01-22 DIAGNOSIS — Z20.2 POTENTIAL EXPOSURE TO STD: ICD-10-CM

## 2020-01-22 LAB
ALBUMIN SERPL-MCNC: 4.3 G/DL (ref 3.4–5)
ALP SERPL-CCNC: 60 U/L (ref 40–150)
ALT SERPL W P-5'-P-CCNC: 30 U/L (ref 0–70)
ANION GAP SERPL CALCULATED.3IONS-SCNC: 4 MMOL/L (ref 3–14)
AST SERPL W P-5'-P-CCNC: 41 U/L (ref 0–45)
BILIRUB SERPL-MCNC: 0.6 MG/DL (ref 0.2–1.3)
BUN SERPL-MCNC: 11 MG/DL (ref 7–30)
CALCIUM SERPL-MCNC: 8.6 MG/DL (ref 8.5–10.1)
CHLORIDE SERPL-SCNC: 106 MMOL/L (ref 94–109)
CO2 SERPL-SCNC: 29 MMOL/L (ref 20–32)
CREAT SERPL-MCNC: 1.01 MG/DL (ref 0.66–1.25)
GFR SERPL CREATININE-BSD FRML MDRD: >90 ML/MIN/{1.73_M2}
GLUCOSE SERPL-MCNC: 103 MG/DL (ref 70–99)
POTASSIUM SERPL-SCNC: 4.3 MMOL/L (ref 3.4–5.3)
PROT SERPL-MCNC: 8.2 G/DL (ref 6.8–8.8)
SODIUM SERPL-SCNC: 140 MMOL/L (ref 133–144)

## 2020-01-23 LAB
C TRACH DNA SPEC QL NAA+PROBE: NEGATIVE
HCV AB SERPL QL IA: NONREACTIVE
N GONORRHOEA DNA SPEC QL NAA+PROBE: NEGATIVE
SPECIMEN SOURCE: NORMAL
T PALLIDUM AB SER QL: NONREACTIVE

## 2020-02-14 ENCOUNTER — OFFICE VISIT (OUTPATIENT)
Dept: DERMATOLOGY | Facility: CLINIC | Age: 35
End: 2020-02-14
Payer: COMMERCIAL

## 2020-02-14 VITALS — SYSTOLIC BLOOD PRESSURE: 142 MMHG | DIASTOLIC BLOOD PRESSURE: 83 MMHG | HEART RATE: 61 BPM

## 2020-02-14 DIAGNOSIS — D22.9 MULTIPLE MELANOCYTIC NEVI: ICD-10-CM

## 2020-02-14 DIAGNOSIS — L21.9 DERMATITIS, SEBORRHEIC: ICD-10-CM

## 2020-02-14 DIAGNOSIS — L40.9 PSORIASIS: Primary | ICD-10-CM

## 2020-02-14 NOTE — NURSING NOTE
Dermatology Rooming Note    Moy Capone's goals for this visit include:   Chief Complaint   Patient presents with     Derm Problem     Psoriasis - Moy states he has not had any scalp issues since starting the medication shampoo and medication     Maureen Gonzalez, [facility]

## 2020-02-14 NOTE — LETTER
2/14/2020       RE: Moy Capone  [address]  [facility] [facility] [address]     Dear Colleague,    Thank you for referring your patient, Moy Capone, to the [facility] DERMATOLOGY at [facility]. Please see a copy of my visit note below.    [facility] Dermatology Note      Dermatology Problem List:  1. Psoriasi s, scalp and body  - triamcinolone 0.1% ointment BID PRN body  - tacrolimus 0.1% ointment BID PRN face  - fluocinolone oil/fluocinonide solution to scalp BID PRN  - ketoconazole 2% shampoo 2-3 times weekly    Encounter Date: Feb 14, 2020    CC:   Chief Complaint   Patient presents with     Derm Problem     Psoriasis - Moy states he has not had any scalp issues since starting the medication shampoo and medication       History of Present Illness:  Mr. Moy Capone is a 34 year old male who presents as a follow-up for psoriasiform dermatitis. The patient was last seen 11/25/19 when he was diagnosed with psoriasiform dermatitis. He was given ketoconazole shampoo, fluocinonide solution, and fluocinolone oil for his scalp. He reports complete clearance of the rash in his scalp. He reports overnight relief of redness and scale with the shampoo. He is currently using the ketoconazole shampoo once every 2 weeks, and the fluocinonide/fluocinolone once a week with good control.    He still has psoriasis plaques on his L posterior ankle, calves, hips/flanks, and elbows. The triamcinolone ointment worked on some areas and didn't help for others. He thinks the tacrolimus works best for his elbows and face. He hasn't noticed a difference in his skin since going to [place] on a training trip. He does not want to use any stronger medications for these areas.    He recently cut a mole while shaving on his face. He would like these checked and is wondering about removal options.    Pertinent Social History:  at the [facility] [employer]    Past  Medical History:   Patient Active Problem List   Diagnosis     Dermatitis, seborrheic     Past Medical History:   Diagnosis Date     Eczema      Past Surgical History:   Procedure Laterality Date     wisdom teeth         Social History:  Patient reports that he has never smoked. He has never used smokeless tobacco. He reports current alcohol use. He reports previous drug use. Drug: Marijuana.    Family History:  Family History   Problem Relation Age of Onset     Hypertension Father      Obesity Father      Alzheimer Disease Maternal Grandfather      Skin Cancer Maternal Grandfather      Melanoma No family hx of        Medications:  Current Outpatient Medications   Medication Sig Dispense Refill     dolutegravir (TIVICAY) 50 MG tablet Take 1 tablet (50 mg) by mouth daily 28 tablet 1     emtricitabine-tenofovir (TRUVADA) 200-300 MG per tablet Take 1 tablet by mouth daily 28 tablet 1     Fluocinolone Acetonide Scalp 0.01 % OIL oil Apply thin layer to scalp 1-2 times daily as needed for itchiness 118.28 mL 6     fluocinonide (LIDEX) 0.05 % external solution Apply 4-6 drops to scalp 1-2 times daily as needed for itchiness 60 mL 6     ketoconazole (NIZORAL) 2 % external shampoo Lather, apply to scalp, leave in for 5 minutes, and then wash out 2-3 times a week 120 mL 11     tacrolimus (PROTOPIC) 0.1 % external ointment Apply topically 2 times daily 60 g 3     triamcinolone (KENALOG) 0.1 % external ointment Apply topically 2 times daily To itchy areas on body 80 g 3     valACYclovir (VALTREX) 1000 mg tablet Take 1 tablet (1,000 mg) by mouth 2 times daily 20 tablet 0     valACYclovir (VALTREX) 500 MG tablet Take 1 tablet (500 mg) by mouth 2 times daily for 3 days 6 tablet 3        Allergies   Allergen Reactions     Intal [Cromolyn]          Review of Systems:  - As per HPI  - Constitutional: Otherwise feeling well today, in usual state of health.  - HEENT: Patient denies nonhealing oral sores.  - Skin: As above in HPI. No  additional skin concerns.    Physical exam:  Vitals: BP (!) 142/83 (BP Location: Right arm, Patient Position: Sitting, Cuff Size: Adult Large)   Pulse 61   GEN: This is a well developed, well-nourished male in no acute distress, in a pleasant mood.    SKIN: Focused examination of the face, neck, back, chest, abdomen, bilateral upper and lower extremities was performed.  - Cerna skin type: II-III  - Scattered discrete well defined scaly pink plaques on the calves, posterior ankles, flanks, hips, elbows, and back  - Mild erythema across the bridge of the nose and central face  - No erythema or scale noted in the scalp  - Few scattered light brown macules and papules on the R cheek, R jaw, and L nasal bridge  - No other lesions of concern on areas examined.     Impression/Plan:    1. Guttate psoriasis, well controlled  Symptoms appear to be well controlled with prn topical triamcinolone oint and protopic. Reassured patient that it is OK to use topical corticosteroids for longer periods of time if needed for thicker, recalcitrant plaques. Since disease is minimal today, there is no indication for new topical or systemic medications. Briefly discussed the potential benefits of bleach/chlorinated baths on psoriasis, though evidence is limited. Discussed the relationship between stress and weight on psoriatic flares. The patient reports losing 25 lbs recently since swimming more often.    Recommend dermasmoothe oil once daily to scaly plaques on the body    Continue triamcinolone 0.1% oint twice daily to body       Continue protopic 0.1% ointment BID PRN to face and elbows    Encouraged continued exercise    Will follow up as needed, due to good control of disease    Future considerations: nbUVB     2. Sebopsoriasis vs seborrheic dermatitis, scalp - resolved with prn topical regimen    Continue fluocinonide 0.05% solution and dermasmoothe oil to scalp once weekly    Continue ketoconazole 2% shampoo every 1-2 weeks  to scalp    3.   Multiple banal-appearing dermal nevi on the face    Discussed benign etiology, reassurance provided. No further treatment indicated    Discussed benefits and risks of cosmetic removal of these nevi, as the patient will have small scar in these areas if he opts for removal, with potential for recurrence. The patient does not want referral for cosmetic removal today, will think about it    Follow-up in 9 months for skin check and psoriasis follow up      Dr. Matute staffed the patient.    Joy Carranza MD  PGY-3 Medicine-Dermatology  Pager [phone]      Staff Involved:  Resident(Joy Carranza MD)/Staff(as above)    Staff Physician Comments:   I saw and evaluated the patient with the resident and I edited the assessment and plan as documented in the note. I was present for the examination.    Mitchell Matute MD   of Dermatology  Department of Dermatology  [facility] [employer]

## 2020-02-14 NOTE — PROGRESS NOTES
[facility] Dermatology Note      Dermatology Problem List:  1. Psoriasis, scalp and body  - triamcinolone 0.1% ointment BID PRN body  - tacrolimus 0.1% ointment BID PRN face  - fluocinolone oil/fluocinonide solution to scalp BID PRN  - ketoconazole 2% shampoo 2-3 times weekly    Encounter Date: Feb 14, 2020    CC:   Chief Complaint   Patient presents with     Derm Problem     Psoriasis - Moy states he has not had any scalp issues since starting the medication shampoo and medication       History of Present Illness:  Mr. Moy Capone is a 34 year old male who presents as a follow-up for psoriasiform dermatitis. The patient was last seen 11/25/19 when he was diagnosed with psoriasiform dermatitis. He was given ketoconazole shampoo, fluocinonide solution, and fluocinolone oil for his scalp. He reports complete clearance of the rash in his scalp. He reports overnight relief of redness and scale with the shampoo. He is currently using the ketoconazole shampoo once every 2 weeks, and the fluocinonide/fluocinolone once a week with good control.    He still has psoriasis plaques on his L posterior ankle, calves, hips/flanks, and elbows. The triamcinolone ointment worked on some areas and didn't help for others. He thinks the tacrolimus works best for his elbows and face. He hasn't noticed a difference in his skin since going to [place] on a training trip. He does not want to use any stronger medications for these areas.    He recently cut a mole while shaving on his face. He would like these checked and is wondering about removal options.    Pertinent Social History:  at the [facility] [employer] [facility]    Past Medical History:   Patient Active Problem List   Diagnosis     Dermatitis, seborrheic     Past Medical History:   Diagnosis Date     Eczema      Past Surgical History:   Procedure Laterality Date     wisdom teeth         Social History:  Patient reports that he has never smoked. He has  never used smokeless tobacco. He reports current alcohol use. He reports previous drug use. Drug: Marijuana.    Family History:  Family History   Problem Relation Age of Onset     Hypertension Father      Obesity Father      Alzheimer Disease Maternal Grandfather      Skin Cancer Maternal Grandfather      Melanoma No family hx of        Medications:  Current Outpatient Medications   Medication Sig Dispense Refill     dolutegravir (TIVICAY) 50 MG tablet Take 1 tablet (50 mg) by mouth daily 28 tablet 1     emtricitabine-tenofovir (TRUVADA) 200-300 MG per tablet Take 1 tablet by mouth daily 28 tablet 1     Fluocinolone Acetonide Scalp 0.01 % OIL oil Apply thin layer to scalp 1-2 times daily as needed for itchiness 118.28 mL 6     fluocinonide (LIDEX) 0.05 % external solution Apply 4-6 drops to scalp 1-2 times daily as needed for itchiness 60 mL 6     ketoconazole (NIZORAL) 2 % external shampoo Lather, apply to scalp, leave in for 5 minutes, and then wash out 2-3 times a week 120 mL 11     tacrolimus (PROTOPIC) 0.1 % external ointment Apply topically 2 times daily 60 g 3     triamcinolone (KENALOG) 0.1 % external ointment Apply topically 2 times daily To itchy areas on body 80 g 3     valACYclovir (VALTREX) 1000 mg tablet Take 1 tablet (1,000 mg) by mouth 2 times daily 20 tablet 0     valACYclovir (VALTREX) 500 MG tablet Take 1 tablet (500 mg) by mouth 2 times daily for 3 days 6 tablet 3        Allergies   Allergen Reactions     Intal [Cromolyn]          Review of Systems:  - As per HPI  - Constitutional: Otherwise feeling well today, in usual state of health.  - HEENT: Patient denies nonhealing oral sores.  - Skin: As above in HPI. No additional skin concerns.    Physical exam:  Vitals: BP (!) 142/83 (BP Location: Right arm, Patient Position: Sitting, Cuff Size: Adult Large)   Pulse 61   GEN: This is a well developed, well-nourished male in no acute distress, in a pleasant mood.    SKIN: Focused examination of the  face, neck, back, chest, abdomen, bilateral upper and lower extremities was performed.  - Cerna skin type: II-III  - Scattered discrete well defined scaly pink plaques on the calves, posterior ankles, flanks, hips, elbows, and back  - Mild erythema across the bridge of the nose and central face  - No erythema or scale noted in the scalp  - Few scattered light brown macules and papules on the R cheek, R jaw, and L nasal bridge  - No other lesions of concern on areas examined.     Impression/Plan:    1. Guttate psoriasis, well controlled  Symptoms appear to be well controlled with prn topical triamcinolone oint and protopic. Reassured patient that it is OK to use topical corticosteroids for longer periods of time if needed for thicker, recalcitrant plaques. Since disease is minimal today, there is no indication for new topical or systemic medications. Briefly discussed the potential benefits of bleach/chlorinated baths on psoriasis, though evidence is limited. Discussed the relationship between stress and weight on psoriatic flares. The patient reports losing 25 lbs recently since swimming more often.    Recommend dermasmoothe oil once daily to scaly plaques on the body    Continue triamcinolone 0.1% oint twice daily to body       Continue protopic 0.1% ointment BID PRN to face and elbows    Encouraged continued exercise    Will follow up as needed, due to good control of disease    Future considerations: nbUVB     2. Sebopsoriasis vs seborrheic dermatitis, scalp - resolved with prn topical regimen    Continue fluocinonide 0.05% solution and dermasmoothe oil to scalp once weekly    Continue ketoconazole 2% shampoo every 1-2 weeks to scalp    3.   Multiple banal-appearing dermal nevi on the face    Discussed benign etiology, reassurance provided. No further treatment indicated    Discussed benefits and risks of cosmetic removal of these nevi, as the patient will have small scar in these areas if he opts for  removal, with potential for recurrence. The patient does not want referral for cosmetic removal today, will think about it    Follow-up in 9 months for skin check and psoriasis follow up      Dr. Matute staffed the patient.    Joy Carranza MD  PGY-3 Medicine-Dermatology  Pager [phone]      Staff Involved:  Resident(Joy Carranza MD)/Staff(as above)    Staff Physician Comments:   I saw and evaluated the patient with the resident and I edited the assessment and plan as documented in the note. I was present for the examination.    Mitchell Matute MD   of Dermatology  Department of Dermatology  [facility]

## 2020-02-14 NOTE — PATIENT INSTRUCTIONS
Continue triamcinolone ointment twice daily as needed for thicker plaques. Can also try dermasmoothe oil daily to scaly areas. If you have a bad flare or think you need something stronger, please send us a message and we can send you a prescription    Continue ketoconazole shampoo every 2 weeks. Use dermasmoothe oil or lidex solution once weekly to scalp for dandruff

## 2020-02-24 ENCOUNTER — OFFICE VISIT (OUTPATIENT)
Dept: FAMILY MEDICINE | Facility: CLINIC | Age: 35
End: 2020-02-24
Payer: COMMERCIAL

## 2020-02-24 VITALS
DIASTOLIC BLOOD PRESSURE: 92 MMHG | HEART RATE: 73 BPM | RESPIRATION RATE: 16 BRPM | OXYGEN SATURATION: 99 % | SYSTOLIC BLOOD PRESSURE: 140 MMHG | TEMPERATURE: 97.9 F

## 2020-02-24 DIAGNOSIS — Z20.2 POTENTIAL EXPOSURE TO STD: Primary | ICD-10-CM

## 2020-02-24 DIAGNOSIS — J03.90 ACUTE TONSILLITIS, UNSPECIFIED ETIOLOGY: ICD-10-CM

## 2020-02-24 LAB
BASOPHILS # BLD AUTO: 0 10E9/L (ref 0–0.2)
BASOPHILS NFR BLD AUTO: 0.6 %
DIFFERENTIAL METHOD BLD: NORMAL
EOSINOPHIL # BLD AUTO: 0.1 10E9/L (ref 0–0.7)
EOSINOPHIL NFR BLD AUTO: 2.1 %
ERYTHROCYTE [DISTWIDTH] IN BLOOD BY AUTOMATED COUNT: 11.6 % (ref 10–15)
HCT VFR BLD AUTO: 45.8 % (ref 40–53)
HGB BLD-MCNC: 15.4 G/DL (ref 13.3–17.7)
IMM GRANULOCYTES # BLD: 0 10E9/L (ref 0–0.4)
IMM GRANULOCYTES NFR BLD: 0.2 %
LYMPHOCYTES # BLD AUTO: 2.3 10E9/L (ref 0.8–5.3)
LYMPHOCYTES NFR BLD AUTO: 42.9 %
MCH RBC QN AUTO: 28.8 PG (ref 26.5–33)
MCHC RBC AUTO-ENTMCNC: 33.6 G/DL (ref 31.5–36.5)
MCV RBC AUTO: 86 FL (ref 78–100)
MONOCYTES # BLD AUTO: 0.3 10E9/L (ref 0–1.3)
MONOCYTES NFR BLD AUTO: 4.8 %
NEUTROPHILS # BLD AUTO: 2.6 10E9/L (ref 1.6–8.3)
NEUTROPHILS NFR BLD AUTO: 49.4 %
NRBC # BLD AUTO: 0 10*3/UL
NRBC BLD AUTO-RTO: 0 /100
PLATELET # BLD AUTO: 283 10E9/L (ref 150–450)
RBC # BLD AUTO: 5.35 10E12/L (ref 4.4–5.9)
WBC # BLD AUTO: 5.2 10E9/L (ref 4–11)

## 2020-02-24 NOTE — NURSING NOTE
34 year old  Chief Complaint   Patient presents with     Consult     Pt. presents to the clinic today for a consult       Blood pressure (!) 140/92, pulse 73, temperature 97.9  F (36.6  C), temperature source Oral, resp. rate 16, SpO2 99 %. There is no height or weight on file to calculate BMI.  BP completed using cuff size:    Patient Active Problem List   Diagnosis     Dermatitis, seborrheic       Wt Readings from Last 2 Encounters:   11/01/19 108 kg (238 lb)   09/27/19 110.7 kg (244 lb 1.6 oz)     BP Readings from Last 3 Encounters:   02/24/20 (!) 140/92   02/14/20 (!) 142/83   11/25/19 135/83       Allergies   Allergen Reactions     Intal [Cromolyn]        Current Outpatient Medications   Medication     Fluocinolone Acetonide Scalp 0.01 % OIL oil     fluocinonide (LIDEX) 0.05 % external solution     ketoconazole (NIZORAL) 2 % external shampoo     tacrolimus (PROTOPIC) 0.1 % external ointment     triamcinolone (KENALOG) 0.1 % external ointment     valACYclovir (VALTREX) 1000 mg tablet     dolutegravir (TIVICAY) 50 MG tablet     emtricitabine-tenofovir (TRUVADA) 200-300 MG per tablet     valACYclovir (VALTREX) 500 MG tablet     No current facility-administered medications for this visit.        Social History     Tobacco Use     Smoking status: Never Smoker     Smokeless tobacco: Never Used   Substance Use Topics     Alcohol use: Yes     Frequency: Monthly or less     Drinks per session: 1 or 2     Binge frequency: Never     Comment: maybe twice alcohol     Drug use: Not Currently     Types: Marijuana     Comment: very seldom       Honoring Choices - Health Care Directive Guide offered to patient at time of visit.    Health Maintenance Due   Topic Date Due     PREVENTIVE CARE VISIT  1985     DTAP/TDAP/TD IMMUNIZATION (1 - Tdap) 10/22/1996     INFLUENZA VACCINE (1) 09/01/2019         There is no immunization history on file for this patient.    No results found for: PAP      Recent Labs   Lab Test  01/22/20  1308   ALT 30   CR 1.01   GFRESTIMATED >90   GFRESTBLACK >90   ALBUMIN 4.3   POTASSIUM 4.3       PHQ-2 ( 1999 Pfizer) 2/14/2020 4/17/2019   Q1: Little interest or pleasure in doing things 0 0   Q2: Feeling down, depressed or hopeless 0 0   PHQ-2 Score 0 0       PHQ-9 SCORE 4/17/2019 9/27/2019   PHQ-9 Total Score 3 1       IAN-7 SCORE 4/17/2019 9/27/2019   Total Score 0 0       No flowsheet data found.    Rosanne Armendariz CMA  February 24, 2020 11:40 AM

## 2020-02-25 ENCOUNTER — MYC MEDICAL ADVICE (OUTPATIENT)
Dept: FAMILY MEDICINE | Facility: CLINIC | Age: 35
End: 2020-02-25

## 2020-02-26 LAB — HIV 1+2 AB+HIV1 P24 AG SERPL QL IA: NONREACTIVE

## 2020-03-11 ENCOUNTER — HEALTH MAINTENANCE LETTER (OUTPATIENT)
Age: 35
End: 2020-03-11

## 2020-03-19 NOTE — PROGRESS NOTES
"Moy Capone is a 34 year old male who presents today with fever, chills, fatigue.  No joint or body aches, his appetite is less but he attributes this to having a sore throat that comes and goes.  He feels that \"one tonsil\" hurts more than the other.  He is using listerine and salt water rinses. All symptoms have been for at least a week.    Moy is also back for his follow up HIV status.  He had an incident 6 weeks ago where he may have been exposed to HIV.  He has been waiting to get his testing and is very anxious about it.     Review Of Systems   ROS: 10 point ROS neg other than the symptoms noted above in the HPI.    Past Medical History:   Diagnosis Date     Eczema      Past Surgical History:   Procedure Laterality Date     wisdom teeth       Social History     Socioeconomic History     Marital status: Single     Spouse name: Not on file     Number of children: Not on file     Years of education: Not on file     Highest education level: Not on file   Occupational History     Not on file   Social Needs     Financial resource strain: Not on file     Food insecurity     Worry: Not on file     Inability: Not on file     Transportation needs     Medical: Not on file     Non-medical: Not on file   Tobacco Use     Smoking status: Never Smoker     Smokeless tobacco: Never Used   Substance and Sexual Activity     Alcohol use: Yes     Frequency: Monthly or less     Drinks per session: 1 or 2     Binge frequency: Never     Comment: maybe twice alcohol     Drug use: Not Currently     Types: Marijuana     Comment: very seldom     Sexual activity: Yes     Partners: Male   Lifestyle     Physical activity     Days per week: Not on file     Minutes per session: Not on file     Stress: Not on file   Relationships     Social connections     Talks on phone: Not on file     Gets together: Not on file     Attends Orthodox service: Not on file     Active member of club or organization: Not on file     Attends meetings of clubs or " organizations: Not on file     Relationship status: Not on file     Intimate partner violence     Fear of current or ex partner: Not on file     Emotionally abused: Not on file     Physically abused: Not on file     Forced sexual activity: Not on file   Other Topics Concern     Parent/sibling w/ CABG, MI or angioplasty before 65F 55M? Not Asked   Social History Narrative     Not on file     Family History   Problem Relation Age of Onset     Hypertension Father      Obesity Father      Alzheimer Disease Maternal Grandfather      Skin Cancer Maternal Grandfather      Melanoma No family hx of        BP (!) 140/92 (BP Location: Left arm, Patient Position: Chair, Cuff Size: Adult Regular)   Pulse 73   Temp 97.9  F (36.6  C) (Oral)   Resp 16   SpO2 99%     Exam:  Constitutional: healthy, alert and moderate distress  Head: Normocephalic. No masses, lesions, tenderness or abnormalities  Neck: Neck supple. No adenopathy. Thyroid symmetric, normal size,, Carotids without bruits.  ENT: pharynx erythematous without exudate  Cardiovascular: negative, PMI normal. No lifts, heaves, or thrills. RRR. No murmurs, clicks gallops or rub  Respiratory: negative, Percussion normal. Good diaphragmatic excursion. Lungs clear  Psychiatric: mentation appears normal, anxious, concentration poor and crying- very worried about HIV status  Hematologic/Lymphatic/Immunologic: Normal cervical lymph nodes    Assessment/Plan:  1. Potential exposure to STD    - HIV Antigen Antibody Combo  I will notify Moy per text (his preferred method) as soon as the results are back.  2. Acute tonsillitis, unspecified etiology    - CBC with platelets differential  Symptomatic treatment, fluids, rest  Return to clinic in one week if symptoms persist    Options for treatment and follow-up care were reviewed with the patient. Patient engaged in the decision making process and verbalized understanding of the options discussed and agreed with the final plan.

## 2020-07-13 ENCOUNTER — VIRTUAL VISIT (OUTPATIENT)
Dept: FAMILY MEDICINE | Facility: CLINIC | Age: 35
End: 2020-07-13
Payer: COMMERCIAL

## 2020-07-13 DIAGNOSIS — Z20.2 POTENTIAL EXPOSURE TO STD: Primary | ICD-10-CM

## 2020-07-13 NOTE — PROGRESS NOTES
"Moy Capone is a 34 year old male who is being evaluated via a billable video visit.      The patient has been notified of following:     \"This video visit will be conducted via a call between you and your physician/provider. We have found that certain health care needs can be provided without the need for an in-person physical exam.  This service lets us provide the care you need with a video conversation.  If a prescription is necessary we can send it directly to your pharmacy.  If lab work is needed we can place an order for that and you can then stop by our lab to have the test done at a later time.    Video visits are billed at different rates depending on your insurance coverage.  Please reach out to your insurance provider with any questions.    If during the course of the call the physician/provider feels a video visit is not appropriate, you will not be charged for this service.\"    Patient has given verbal consent for Video visit? Yes  How would you like to obtain your AVS? Walter  Patient would like the video invitation sent by: Walter  Will anyone else be joining your video visit? No    Subjective     Moy Capone is a 34 year old male who presents today via video visit for the following health issues: Moy is in a new relationship and has had intercourse, starting on June 6th.  His partner was contacted by [facility] that his old partner had tested positive for syphilis.  Neither Moy or his partner have any symptoms of any concern at all, Moy wants to be tested for syphilis and for gonorrhea and chlamydia as well.    Moy is a  at the [employer] and his team has just been delayed from coming to campus for 2 weeks.  He is feeling confident that things will be good for him and his job, he is exercising at home and feels very good about that.    Video Start Time: 1258    Review of Systems   CONSTITUTIONAL: NEGATIVE for fever, chills, change in weight  ENT/MOUTH: NEGATIVE for " "ear, mouth and throat problems  RESP: NEGATIVE for significant cough or SOB  CV: NEGATIVE for chest pain, palpitations or peripheral edema  : negative for dysuria, hematuria, decreased urinary stream, erectile dysfunction      Objective      Physical Exam     GENERAL: Healthy, alert and no distress  EYES: Eyes grossly normal to inspection.  No discharge or erythema, or obvious scleral/conjunctival abnormalities.  RESP: No audible wheeze, cough, or visible cyanosis.  No visible retractions or increased work of breathing.    SKIN: Visible skin clear. No significant rash, abnormal pigmentation or lesions.  NEURO: Cranial nerves grossly intact.  Mentation and speech appropriate for age.  PSYCH: Mentation appears normal, affect normal/bright, judgement and insight intact, normal speech and appearance well-groomed.    Assessment & Plan     1. Potential exposure to STD  - NEISSERIA GONORRHOEA PCR; Future  - CHLAMYDIA TRACHOMATIS PCR; Future  - Treponema Abs w Reflex to RPR and Titer; Future    Moy will call to make a lab only appointment, then he and I will go over his results by phone when they are back.   BMI:   Estimated body mass index is 32.73 kg/m  as calculated from the following:    Height as of 11/1/19: 1.816 m (5' 11.5\").    Weight as of 11/1/19: 108 kg (238 lb).    Indu Brady NP   [facility]      Video-Visit Details    Type of service:  Video Visit    Video End Time: 1319    Originating Location (pt. Location): Home    Distant Location (provider location):   [facility]     Platform used for Video Visit: EasyPaint    No follow-ups on file.       Indu Brady NP        "

## 2020-07-15 DIAGNOSIS — Z20.2 POTENTIAL EXPOSURE TO STD: ICD-10-CM

## 2020-07-16 LAB
C TRACH DNA SPEC QL NAA+PROBE: NEGATIVE
N GONORRHOEA DNA SPEC QL NAA+PROBE: NEGATIVE
SPECIMEN SOURCE: NORMAL
SPECIMEN SOURCE: NORMAL
T PALLIDUM AB SER QL: NONREACTIVE

## 2020-07-27 DIAGNOSIS — L40.9 PSORIASIS: Primary | ICD-10-CM

## 2020-09-27 DIAGNOSIS — L65.9 HAIR LOSS: ICD-10-CM

## 2020-09-28 ENCOUNTER — OFFICE VISIT (OUTPATIENT)
Dept: DERMATOLOGY | Facility: CLINIC | Age: 35
End: 2020-09-28
Payer: COMMERCIAL

## 2020-09-28 DIAGNOSIS — D22.9 MULTIPLE MELANOCYTIC NEVI: Primary | ICD-10-CM

## 2020-09-28 DIAGNOSIS — D23.9 DERMATOFIBROMA: ICD-10-CM

## 2020-09-28 DIAGNOSIS — L40.9 PSORIASIS: ICD-10-CM

## 2020-09-28 ASSESSMENT — PAIN SCALES - GENERAL: PAINLEVEL: NO PAIN (0)

## 2020-09-28 NOTE — LETTER
"9/28/2020       RE: Moy Capone  [address]  [place] [address]     Dear Colleague,    Thank you for referring your patient, Moy Capone, to the [facility]ERMATOLOGY at [facility]. Please see a copy of my visit note below.    [facility] Dermatology Note      Dermatology Problem List:  1. Psoriasis, scalp and body  - triamcinolone 0.1% ointment BID PRN body  - tacrolimus 0.1% ointment BID PRN face  - fluocinolone oil/fluocinonide solution to scalp BID PRN  - ketoconazole 2% shampoo 2-3 times weekly       CC:   Chief Complaint   Patient presents with     Derm Problem     Moy is here today for a full body skin check. He states \" I have a lot of moles and a family history of skin cancer\"         Encounter Date: Sep 28, 2020    History of Present Illness:  Mr. Moy Capone is a 34 year old male who presents for follow up.    Overall psoriasis has been doing very well - only small amount on the elbows with redness and scaling   - scalp has been doing well - no itching, redness, or dandruff    A few moles of concern on legs - purple/brown, sometimes itchy    Has mole on the bridge of the nose - has increased over time but asymptomatic    Past Medical History:   Past Medical History:   Diagnosis Date     Eczema      Past Surgical History:   Procedure Laterality Date     wisdom teeth         Social History:   reports that he has never smoked. He has never used smokeless tobacco. He reports current alcohol use. He reports previous drug use. Drug: Marijuana.    Family History:  Family History   Problem Relation Age of Onset     Hypertension Father      Obesity Father      Alzheimer Disease Maternal Grandfather      Skin Cancer Maternal Grandfather      Melanoma No family hx of        Medications:  Current Outpatient Medications   Medication Sig Dispense Refill     Fluocinolone Acetonide Scalp 0.01 % OIL oil Apply thin layer to scalp 1-2 times daily " as needed for itchiness 118.28 mL 6     fluocinonide (LIDEX) 0.05 % external solution Apply 4-6 drops to scalp 1-2 times daily as needed for itchiness 60 mL 6     Humidifier MISC Use humidifer (medical supply) daily to increase ambient environmental humidity for symptomatic control of psoriasis 1 each 11     ketoconazole (NIZORAL) 2 % external shampoo Lather, apply to scalp, leave in for 5 minutes, and then wash out 2-3 times a week 120 mL 11     tacrolimus (PROTOPIC) 0.1 % external ointment Apply topically 2 times daily 60 g 3     dolutegravir (TIVICAY) 50 MG tablet Take 1 tablet (50 mg) by mouth daily (Patient not taking: Reported on 2/24/2020) 28 tablet 1     doxycycline hyclate (VIBRAMYCIN) 100 MG capsule Take 1 capsule (100 mg) by mouth 2 times daily 20 capsule 0     emtricitabine-tenofovir (TRUVADA) 200-300 MG per tablet Take 1 tablet by mouth daily (Patient not taking: Reported on 2/24/2020) 28 tablet 1     triamcinolone (KENALOG) 0.1 % external ointment Apply topically 2 times daily To itchy areas on body (Patient not taking: Reported on 7/13/2020) 80 g 3     valACYclovir (VALTREX) 1000 mg tablet Take 1 tablet (1,000 mg) by mouth 2 times daily 20 tablet 0     valACYclovir (VALTREX) 500 MG tablet Take 1 tablet (500 mg) by mouth 2 times daily for 3 days (Patient not taking: Reported on 7/13/2020) 6 tablet 3     Allergies   Allergen Reactions     Intal [Cromolyn]          Review of Systems:  - As per HPI  - Constitutional: The patient denies fatigue, fevers, chills, unintended weight loss, and night sweats.  - Skin: As above in HPI. No additional skin concerns.    Physical exam:  Vitals: There were no vitals taken for this visit.  GEN: This is a well developed, well-nourished male in no acute distress, in a pleasant mood.   SKIN: Cerna phototype III  - Full skin, which includes the head/face, both arms, chest, back, abdomen,both legs, genitalia and/or groin buttocks, digits and/or nails, was examined.  -  erythematous scaly papules and confluent plaques on the extensor elbows  - slight scaling on the occipital scalp  - erythematous/hyperpigmented papules on the left shin and right lateral popliteal fossa with positive dimple sign  - No other lesions of concern on areas examined.     Impression/Plan:  1. Psoriasis: mild today with minimal activity on the elbows. Continue tacrolimus ointment as needed. Scalp is doing well overall.    Continue tacrolimus 0.1% ointment    2. Dermatofibromata: on lower legs; reassurance provided.     3. Melanocytic nevi on the bridge of nose and face, trunk, and extremities.    Reassurance provided. Discussed sun protective behaviors including OTC spf 30+ sunscreen use and sun avoidance strategies.    Follow-up in 1 year, earlier for new or changing lesions.       Staff Involved:  Staff Only    Mitchell Matute MD   of Dermatology  Department of Dermatology  [facility]

## 2020-09-28 NOTE — NURSING NOTE
"Chief Complaint   Patient presents with     Derm Problem     Moy is here today for a full body skin check. He states \" I have a lot of moles and a family history of skin cancer\"     Raquel Thorpe, RMA  "

## 2020-10-01 RX ORDER — TACROLIMUS 1 MG/G
OINTMENT TOPICAL
Qty: 60 G | Refills: 2 | Status: SHIPPED | OUTPATIENT
Start: 2020-10-01

## 2020-11-03 ENCOUNTER — HOSPITAL ENCOUNTER (EMERGENCY)
Facility: CLINIC | Age: 35
Discharge: HOME | End: 2020-11-03
Attending: EMERGENCY MEDICINE | Admitting: EMERGENCY MEDICINE
Payer: COMMERCIAL

## 2020-11-03 VITALS
TEMPERATURE: 98.9 F | DIASTOLIC BLOOD PRESSURE: 95 MMHG | BODY MASS INDEX: 32.8 KG/M2 | HEART RATE: 93 BPM | WEIGHT: 238.5 LBS | RESPIRATION RATE: 16 BRPM | SYSTOLIC BLOOD PRESSURE: 162 MMHG | OXYGEN SATURATION: 96 %

## 2020-11-03 DIAGNOSIS — L03.90 CELLULITIS, UNSPECIFIED CELLULITIS SITE: ICD-10-CM

## 2020-11-03 PROCEDURE — 99282 EMERGENCY DEPT VISIT SF MDM: CPT | Performed by: EMERGENCY MEDICINE

## 2020-11-03 PROCEDURE — 99284 EMERGENCY DEPT VISIT MOD MDM: CPT | Performed by: EMERGENCY MEDICINE

## 2020-11-03 RX ORDER — CIPROFLOXACIN 500 MG/1
500 TABLET, FILM COATED ORAL 2 TIMES DAILY
Qty: 14 TABLET | Refills: 0 | Status: SHIPPED | OUTPATIENT
Start: 2020-11-03 | End: 2020-11-10

## 2020-11-03 RX ORDER — DOXYCYCLINE 100 MG/1
100 CAPSULE ORAL 2 TIMES DAILY
Qty: 14 CAPSULE | Refills: 0 | Status: SHIPPED | OUTPATIENT
Start: 2020-11-03 | End: 2020-11-10

## 2020-11-03 ASSESSMENT — ENCOUNTER SYMPTOMS
SHORTNESS OF BREATH: 0
ABDOMINAL PAIN: 0
FEVER: 0

## 2020-11-03 NOTE — ED AVS SNAPSHOT
[facility] Emergency Department  [address] [address]  Phone: [phone]                                    Moy Capone   MRN: [id number]    Department: [facility] Emergency Department   Date of Visit: 11/3/2020           After Visit Summary Signature Page    I have received my discharge instructions, and my questions have been answered. I have discussed any challenges I see with this plan with the nurse or doctor.    ..........................................................................................................................................  Patient/Patient Representative Signature      ..........................................................................................................................................  Patient Representative Print Name and Relationship to Patient    ..................................................               ................................................  Date                                   Time    ..........................................................................................................................................  Reviewed by Signature/Title    ...................................................              ..............................................  Date                                               Time          22EPIC Rev 08/18

## 2020-11-03 NOTE — DISCHARGE INSTRUCTIONS
Please make an appointment to follow up with your primary clinic as soon as possible if not improving.    Return if worse swelling, pain or fever.

## 2020-11-03 NOTE — ED TRIAGE NOTES
Patient presents ambulatory to triage with c/o blister. Patient reports blister on right heel since Saturday, which he is concerned is infected. Patient reports clear liquid oozing from blister and red streaks from heel to right calf. Denies fevers.

## 2020-11-03 NOTE — ED PROVIDER NOTES
ED Provider Note  [facility]      History     Chief Complaint   Patient presents with     Blister     HPI  Moy Capone is a 35 year old male who developed a blister on his right heel 3 days ago.  It has been draining but then today he developed redness around it with red streaking all the way up to the middle thigh with mild tenderness along the streaking.  He denies any fever or chills.  He does not feel systemically ill.  He did notice slight swelling in the palms of both hands and a red raised area on the dorsum of both feet symmetrically.  He denies any prior similar infections.  The patient works as a  but has not been in the water recently.    Past Medical History  Past Medical History:   Diagnosis Date     Eczema      Past Surgical History:   Procedure Laterality Date     wisdom teeth            ciprofloxacin (CIPRO) 500 MG tablet       doxycycline hyclate (VIBRAMYCIN) 100 MG capsule       Fluocinolone Acetonide Scalp 0.01 % OIL oil       fluocinonide (LIDEX) 0.05 % external solution       Humidifier MISC       ketoconazole (NIZORAL) 2 % external shampoo       tacrolimus (PROTOPIC) 0.1 % external ointment       triamcinolone (KENALOG) 0.1 % external ointment      Allergies   Allergen Reactions     Intal [Cromolyn]      Family History  Family History   Problem Relation Age of Onset     Hypertension Father      Obesity Father      Alzheimer Disease Maternal Grandfather      Skin Cancer Maternal Grandfather      Melanoma No family hx of      Social History   Social History     Tobacco Use     Smoking status: Never Smoker     Smokeless tobacco: Never Used   Substance Use Topics     Alcohol use: Yes     Frequency: Monthly or less     Drinks per session: 1 or 2     Binge frequency: Never     Comment: maybe twice alcohol     Drug use: Not Currently     Types: Marijuana     Comment: very seldom      Past medical history, past surgical history, medications, allergies, family  history, and social history were reviewed with the patient. No additional pertinent items.       Review of Systems   Constitutional: Negative for fever.   Respiratory: Negative for shortness of breath.    Cardiovascular: Negative for chest pain.   Gastrointestinal: Negative for abdominal pain.   All other systems reviewed and are negative.    A complete review of systems was performed with pertinent positives and negatives noted in the HPI, and all other systems negative.    Physical Exam   BP: (!) 158/94  Pulse: 95  Temp: 98.9  F (37.2  C)  Resp: 16  Weight: 108.2 kg (238 lb 8 oz)  SpO2: 97 %  Physical Exam  Constitutional:       General: He is not in acute distress.     Appearance: He is not ill-appearing.   HENT:      Head: Atraumatic.      Mouth/Throat:      Mouth: Mucous membranes are moist.      Comments: No oral lesions  Eyes:      General: No scleral icterus.     Conjunctiva/sclera: Conjunctivae normal.   Neck:      Musculoskeletal: Neck supple.   Pulmonary:      Effort: Pulmonary effort is normal. No tachypnea or respiratory distress.   Skin:     General: Skin is warm.      Coloration: Skin is not pale.             Comments: No tenderness into the groin   Neurological:      Mental Status: He is alert and oriented to person, place, and time.      Motor: Motor function is intact.         ED Course      Procedures                         No results found for any visits on 11/03/20.  Medications - No data to display     Assessments & Plan (with Medical Decision Making)   The patient has a ruptured blister which has become infected with lymphangitis up his leg.  He has no deep tenderness or systemic symptoms other than mild redness in his palms which could be an early reaction to strep.  He has no signs of Dumont-Holden.  He has no clinical signs of sepsis or necrotizing fasciitis.  The patient works as a  at the [employer] and is around water all day and I am concerned about Pseudomonas as well has  possible strep or MRSA.  Doxycycline is my first choice antibiotic but we will add on Cipro to cover Pseudomonas and take that at least 3 days.  He agrees to return if he develops any worsening symptoms.    I have reviewed the nursing notes. I have reviewed the findings, diagnosis, plan and need for follow up with the patient.    New Prescriptions    CIPROFLOXACIN (CIPRO) 500 MG TABLET    Take 1 tablet (500 mg) by mouth 2 times daily for 7 days    DOXYCYCLINE HYCLATE (VIBRAMYCIN) 100 MG CAPSULE    Take 1 capsule (100 mg) by mouth 2 times daily for 7 days       Final diagnoses:   Cellulitis, unspecified cellulitis site       --  Garry Maria  [facility] EMERGENCY DEPARTMENT  11/3/2020     Garry Maria MD  11/03/20 0910

## 2021-01-03 ENCOUNTER — HEALTH MAINTENANCE LETTER (OUTPATIENT)
Age: 36
End: 2021-01-03

## 2021-04-25 ENCOUNTER — HEALTH MAINTENANCE LETTER (OUTPATIENT)
Age: 36
End: 2021-04-25

## 2021-05-03 ENCOUNTER — TELEPHONE (OUTPATIENT)
Dept: FAMILY MEDICINE | Facility: CLINIC | Age: 36
End: 2021-05-03

## 2021-05-03 NOTE — TELEPHONE ENCOUNTER
Prior Authorization Retail Medication Request    Medication/Dose: TACROLIMUS 0.1% OINTMENT 30GM  ICD code (if different than what is on RX):  Hair loss [L65.9]   Previously Tried and Failed: See Chart  Rationale: See Chart    Insurance Name:  MEDICA  Insurance ID:  [id number]      Pharmacy Information (if different than what is on RX)  Name:  [facility]  Phone:  [phone]

## 2021-05-04 NOTE — TELEPHONE ENCOUNTER
Central Prior Authorization Team   Phone: [phone]      PA Initiation    Medication: TACROLIMUS 0.1% OINTMENT 30GM  Insurance Company: [employer] - Phone [phone] Fax [phone]  Pharmacy Filling the Rx: [id number] [place], [place] - [address] AT [facility] [address]  Filling Pharmacy Phone: [phone]  Filling Pharmacy Fax:    Start Date: 5/4/2021

## 2021-05-04 NOTE — TELEPHONE ENCOUNTER
Prior Authorization Approval    Authorization Effective Date: 4/29/2021  Authorization Expiration Date: 4/29/2022  Medication: TACROLIMUS 0.1% OINTMENT 30GM  Approved Dose/Quantity:  Reference #:     Insurance Company: [employer] - Phone [phone] Fax [phone]  Expected CoPay:       Which Pharmacy is filling the prescription (Not needed for infusion/clinic administered): [id number] - [place], [place] - [facility] [address]  Pharmacy Notified: Yes  Patient Notified: No

## 2021-10-10 ENCOUNTER — HEALTH MAINTENANCE LETTER (OUTPATIENT)
Age: 36
End: 2021-10-10

## 2022-03-06 NOTE — PROGRESS NOTES
"[facility] Dermatology Note      Dermatology Problem List:  1. Psoriasis, scalp and body  - triamcinolone 0.1% ointment BID PRN body  - tacrolimus 0.1% ointment BID PRN face  - fluocinolone oil/fluocinonide solution to scalp BID PRN  - ketoconazole 2% shampoo 2-3 times weekly       CC:   Chief Complaint   Patient presents with     Derm Problem     Moy is here today for a full body skin check. He states \" I have a lot of moles and a family history of skin cancer\"         Encounter Date: Sep 28, 2020    History of Present Illness:  Mr. Moy Capone is a 34 year old male who presents for follow up.    Overall psoriasis has been doing very well - only small amount on the elbows with redness and scaling   - scalp has been doing well - no itching, redness, or dandruff    A few moles of concern on legs - purple/brown, sometimes itchy    Has mole on the bridge of the nose - has increased over time but asymptomatic    Past Medical History:   Past Medical History:   Diagnosis Date     Eczema      Past Surgical History:   Procedure Laterality Date     wisdom teeth         Social History:   reports that he has never smoked. He has never used smokeless tobacco. He reports current alcohol use. He reports previous drug use. Drug: Marijuana.    Family History:  Family History   Problem Relation Age of Onset     Hypertension Father      Obesity Father      Alzheimer Disease Maternal Grandfather      Skin Cancer Maternal Grandfather      Melanoma No family hx of        Medications:  Current Outpatient Medications   Medication Sig Dispense Refill     Fluocinolone Acetonide Scalp 0.01 % OIL oil Apply thin layer to scalp 1-2 times daily as needed for itchiness 118.28 mL 6     fluocinonide (LIDEX) 0.05 % external solution Apply 4-6 drops to scalp 1-2 times daily as needed for itchiness 60 mL 6     Humidifier MISC Use humidifer (medical supply) daily to increase ambient environmental humidity for symptomatic control " of psoriasis 1 each 11     ketoconazole (NIZORAL) 2 % external shampoo Lather, apply to scalp, leave in for 5 minutes, and then wash out 2-3 times a week 120 mL 11     tacrolimus (PROTOPIC) 0.1 % external ointment Apply topically 2 times daily 60 g 3     dolutegravir (TIVICAY) 50 MG tablet Take 1 tablet (50 mg) by mouth daily (Patient not taking: Reported on 2/24/2020) 28 tablet 1     doxycycline hyclate (VIBRAMYCIN) 100 MG capsule Take 1 capsule (100 mg) by mouth 2 times daily 20 capsule 0     emtricitabine-tenofovir (TRUVADA) 200-300 MG per tablet Take 1 tablet by mouth daily (Patient not taking: Reported on 2/24/2020) 28 tablet 1     triamcinolone (KENALOG) 0.1 % external ointment Apply topically 2 times daily To itchy areas on body (Patient not taking: Reported on 7/13/2020) 80 g 3     valACYclovir (VALTREX) 1000 mg tablet Take 1 tablet (1,000 mg) by mouth 2 times daily 20 tablet 0     valACYclovir (VALTREX) 500 MG tablet Take 1 tablet (500 mg) by mouth 2 times daily for 3 days (Patient not taking: Reported on 7/13/2020) 6 tablet 3     Allergies   Allergen Reactions     Intal [Cromolyn]          Review of Systems:  - As per HPI  - Constitutional: The patient denies fatigue, fevers, chills, unintended weight loss, and night sweats.  - Skin: As above in HPI. No additional skin concerns.    Physical exam:  Vitals: There were no vitals taken for this visit.  GEN: This is a well developed, well-nourished male in no acute distress, in a pleasant mood.   SKIN: Cerna phototype III  - Full skin, which includes the head/face, both arms, chest, back, abdomen,both legs, genitalia and/or groin buttocks, digits and/or nails, was examined.  - erythematous scaly papules and confluent plaques on the extensor elbows  - slight scaling on the occipital scalp  - erythematous/hyperpigmented papules on the left shin and right lateral popliteal fossa with positive dimple sign  - No other lesions of concern on areas examined.      Impression/Plan:  1. Psoriasis: mild today with minimal activity on the elbows. Continue tacrolimus ointment as needed. Scalp is doing well overall.    Continue tacrolimus 0.1% ointment    2. Dermatofibromata: on lower legs; reassurance provided.     3. Melanocytic nevi on the bridge of nose and face, trunk, and extremities.    Reassurance provided. Discussed sun protective behaviors including OTC spf 30+ sunscreen use and sun avoidance strategies.    Follow-up in 1 year, earlier for new or changing lesions.       Staff Involved:  Staff Only    Mitchell Matute MD   of Dermatology  Department of Dermatology  [facility]       Pt adm w/ hypertensive urgency ; near syncope ; severe carotid stenosis. Denies N/V/D/C/Chewing/Swallowing issues, No food allergies. Pt c/o food not being seasoned enough. Discussed and provided pt w/ Hypertension Nutrition therapy and Sodium free flavoring tips literature. Pt denies being food Insecure.

## 2022-05-21 ENCOUNTER — HEALTH MAINTENANCE LETTER (OUTPATIENT)
Age: 37
End: 2022-05-21

## 2022-09-18 ENCOUNTER — HEALTH MAINTENANCE LETTER (OUTPATIENT)
Age: 37
End: 2022-09-18

## 2023-03-30 NOTE — PROGRESS NOTES
Head , atraumatic , Oral cavity appearance normal , Appearance normal Moy Capone is a 33 year old male who presents today to both establish care and to be tested for STI's.  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  He admits that it was quite an adjustment, but just recently he has had more social life and subsequently has had unprotected sex 3 times in the recent past.  His partners were all males.  Moy has no symptoms.      He feels that he is eating poorly, he is now trying to get back on track with lifestyle; eating, exercise and his social life.  He is very aware that having sex without protection puts him at risk for STI's.    Moy is generally very healthy and in good spirits.  He is trying to regain that ground now.    Review Of Systems   ROS: 10 point ROS neg other than the symptoms noted above in the HPI.    Past Medical History:   Diagnosis Date     Eczema      Past Surgical History:   Procedure Laterality Date     wisdom teeth       Social History     Socioeconomic History     Marital status: Single     Spouse name: Not on file     Number of children: Not on file     Years of education: Not on file     Highest education level: Not on file   Occupational History     Not on file   Social Needs     Financial resource strain: Not on file     Food insecurity:     Worry: Not on file     Inability: Not on file     Transportation needs:     Medical: Not on file     Non-medical: Not on file   Tobacco Use     Smoking status: Never Smoker     Smokeless tobacco: Never Used   Substance and Sexual Activity     Alcohol use: Yes     Frequency: Monthly or less     Drinks per session: 1 or 2     Binge frequency: Never     Comment: maybe twice alcohol     Drug use: Not Currently     Types: Marijuana     Comment: very seldom     Sexual activity: Yes     Partners: Male   Lifestyle     Physical activity:     Days per week: Not on file     Minutes per session: Not on file     Stress: Not on file   Relationships     Social  "connections:     Talks on phone: Not on file     Gets together: Not on file     Attends Mosque service: Not on file     Active member of club or organization: Not on file     Attends meetings of clubs or organizations: Not on file     Relationship status: Not on file     Intimate partner violence:     Fear of current or ex partner: Not on file     Emotionally abused: Not on file     Physically abused: Not on file     Forced sexual activity: Not on file   Other Topics Concern     Not on file   Social History Narrative     Not on file     Family History   Problem Relation Age of Onset     Hypertension Father      Obesity Father      Alzheimer Disease Maternal Grandfather        /84 (BP Location: Left arm, Patient Position: Chair, Cuff Size: Adult Large)   Pulse 81   Temp 97.7  F (36.5  C) (Oral)   Resp 16   Ht 1.824 m (5' 11.8\")   Wt 108.4 kg (239 lb)   SpO2 97%   BMI 32.60 kg/m      Exam:  Constitutional: healthy, alert and no distress  Psychiatric: mentation appears normal and affect normal/bright    Assessment/Plan:  1. Potential exposure to STD    - HIV Antigen Antibody Combo  - Treponema Abs w Reflex to RPR and Titer  - NEISSERIA GONORRHOEA PCR  - CHLAMYDIA TRACHOMATIS PCR    2. Encounter to establish care    Moy will wait to get results, will follow up as needed prn.    "

## 2023-06-04 ENCOUNTER — HEALTH MAINTENANCE LETTER (OUTPATIENT)
Age: 38
End: 2023-06-04

## 2023-08-21 ENCOUNTER — OFFICE VISIT (OUTPATIENT)
Dept: FAMILY MEDICINE | Facility: CLINIC | Age: 38
End: 2023-08-21
Payer: COMMERCIAL

## 2023-08-21 VITALS
WEIGHT: 255.4 LBS | BODY MASS INDEX: 33.85 KG/M2 | HEART RATE: 74 BPM | DIASTOLIC BLOOD PRESSURE: 85 MMHG | RESPIRATION RATE: 18 BRPM | OXYGEN SATURATION: 96 % | HEIGHT: 73 IN | TEMPERATURE: 97.9 F | SYSTOLIC BLOOD PRESSURE: 128 MMHG

## 2023-08-21 DIAGNOSIS — M77.8 TENDONITIS OF WRIST, RIGHT: Primary | ICD-10-CM

## 2023-08-21 ASSESSMENT — PAIN SCALES - GENERAL: PAINLEVEL: NO PAIN (0)
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
## 2023-08-21 NOTE — NURSING NOTE
"ROOM:1  EWA JOSHI    Preferred Name: Moy     How did you hear about us?  Current Patient    37 year old  Chief Complaint   Patient presents with     Musculoskeletal Problem     Hurt right wrist moving boxes about a month ago  Could not rotate wrist or move thumb but now it is able to rotate and move thumb  Stays in wrist       Blood pressure 128/85, pulse 74, temperature 97.9  F (36.6  C), temperature source Oral, resp. rate 18, height 1.857 m (6' 1.1\"), weight 115.8 kg (255 lb 6.4 oz), SpO2 96 %. Body mass index is 33.6 kg/m .  BP completed using cuff size:        Patient Active Problem List   Diagnosis     Dermatitis, seborrheic       Wt Readings from Last 2 Encounters:   08/21/23 115.8 kg (255 lb 6.4 oz)   11/03/20 108.2 kg (238 lb 8 oz)     BP Readings from Last 3 Encounters:   08/21/23 128/85   11/03/20 (!) 162/95   02/24/20 (!) 140/92       Allergies   Allergen Reactions     Intal [Cromolyn]        Current Outpatient Medications   Medication     tacrolimus (PROTOPIC) 0.1 % external ointment     Fluocinolone Acetonide Scalp 0.01 % OIL oil     fluocinonide (LIDEX) 0.05 % external solution     Humidifier MISC     ketoconazole (NIZORAL) 2 % external shampoo     triamcinolone (KENALOG) 0.1 % external ointment     No current facility-administered medications for this visit.       Social History     Tobacco Use     Smoking status: Never     Smokeless tobacco: Never   Vaping Use     Vaping Use: Never used   Substance Use Topics     Alcohol use: Yes     Comment: maybe twice alcohol     Drug use: Not Currently     Types: Marijuana     Comment: very seldom       Honoring Choices - Health Care Directive Guide offered to patient at time of visit.    Health Maintenance Due   Topic Date Due     YEARLY PREVENTIVE VISIT  Never done     ADVANCE CARE PLANNING  Never done     HEPATITIS B IMMUNIZATION (1 of 3 - 3-dose series) Never done     HEPATITIS A IMMUNIZATION (1 of 2 - Risk 2-dose series) Never done     DTAP/TDAP/TD " IMMUNIZATION (1 - Tdap) Never done     LIPID  Never done     COVID-19 Vaccine (3 - Pfizer series) 05/27/2021       Immunization History   Administered Date(s) Administered     COVID-19 MONOVALENT 12+ (Pfizer) 03/11/2021, 04/01/2021       No results found for: PAP    Recent Labs   Lab Test 01/22/20  1308   ALT 30   CR 1.01   GFRESTIMATED >90   GFRESTBLACK >90   ALBUMIN 4.3   POTASSIUM 4.3           8/21/2023     8:15 AM 7/13/2020    12:29 PM   PHQ-2 ( 1999 Pfizer)   Q1: Little interest or pleasure in doing things 0 0   Q2: Feeling down, depressed or hopeless 0 0   PHQ-2 Score 0 0   PHQ-2 Total Score (12-17 Years)- Positive if 3 or more points; Administer PHQ-A if positive  0           4/17/2019    11:14 AM 9/27/2019     9:29 AM   PHQ-9 SCORE   PHQ-9 Total Score 3 1           4/17/2019    11:14 AM 9/27/2019     9:29 AM   IAN-7 SCORE   Total Score 0 0            No data to display                Angelique Ruiz, EMT    August 21, 2023 8:18 AM

## 2023-08-22 DIAGNOSIS — M25.531 RIGHT WRIST PAIN: Primary | ICD-10-CM

## 2023-09-06 ENCOUNTER — THERAPY VISIT (OUTPATIENT)
Dept: OCCUPATIONAL THERAPY | Facility: CLINIC | Age: 38
End: 2023-09-06
Attending: NURSE PRACTITIONER
Payer: COMMERCIAL

## 2023-09-06 DIAGNOSIS — M25.531 RIGHT WRIST PAIN: ICD-10-CM

## 2023-09-06 PROCEDURE — 97110 THERAPEUTIC EXERCISES: CPT | Mod: GO | Performed by: OCCUPATIONAL THERAPIST

## 2023-09-06 PROCEDURE — 97165 OT EVAL LOW COMPLEX 30 MIN: CPT | Mod: GO | Performed by: OCCUPATIONAL THERAPIST

## 2023-09-06 NOTE — PROGRESS NOTES
OCCUPATIONAL THERAPY EVALUATION  Type of Visit: Evaluation    See electronic medical record for Abuse and Falls Screening details.    Subjective   Presenting condition or subjective complaint:  Right wrist pain  Date of onset: 1.5 months ago (Order date: 8/22/23)    Relevant medical history:  Past Medical History:   Diagnosis Date    Eczema      Dates & types of surgery:  Past Surgical History:   Procedure Laterality Date    wisdom teeth          Prior diagnostic imaging/testing results: None  Prior therapy history for the same diagnosis, illness or injury: None    Prior level of function:  Transfers: Independent  Ambulation: Independent  ADL: Independent  IADL: Independent    Living environment: Patient is independent at home and there are no concerns about accessibility and mobility in the home.    Employment: Owns own business, organizational leadership and management coaching   Hobbies/Interests: Swimming, tennis, cooking, reading, lifting weights    Patient goals for therapy: Regain motion and reduce pain of right wrist and thumb    Other: Patient carrying a long rectangular box for three blocks. Felt tension in the wrist,but not painful initially. Went to answer a phone call and was unable to move the wrist and thumb.     Objective   Right hand dominant  Patient reports symptoms of pain, stiffness/loss of motion, and weakness/loss of strength    Pain Level (Scale 0-10):   9/6/2023   At Rest 0/10   With Use 5/10     Pain Description:  Date 9/6/2023   Location Radial aspect of wrist and thumb   Pain Quality Sharp   Frequency intermittent     Pain is worst  daytime   Exacerbated by Ulnar deviation of wrist; pushing through wrist, pulling up pants, putting on seatbelt, texting, typing   Relieved by Wearing brace, NSAIDs   Progression Gradually improving     Sensation   WNL throughout all nerve distributions; per patient report.    Edema   - none  + mild    ++ moderate    +++ severe    9/6/2023   1st DC Tender    Radial Styloid Tender      ROM  Pain Report: - none  + mild    ++ moderate    +++ severe   Thumb 9/6/2023 9/6/2023   AROM (PROM) Left Right   MP 0/37 0/29   IP 0/80 0/68   RABD 65 57   PABD 60 53   Opposition Small finger MCP joint crease Tip of small finger     Wrist 9/6/2023 9/6/2023   AROM (PROM) Left Right   Extension 75 63, tightness in radial wrist   Flexion 77 75, tightness in radial wrist   RD 20 25, pressure in 1st dorsal compartment   UD 40 15+     Resisted Testing  Pain Report: - none  + mild    ++ moderate    +++ severe    9/6/2023   APL -   EPB -   EPL -   FCR NT     Strength   (Measured in pounds)  Pain Report: - none  + mild    ++ moderate    +++ severe    9/6/2023 9/6/2023   Trials Left Right   1 129 104     Lat Pinch 9/6/2023 9/6/2023   Trials Left Right   1 28 27     3 Pt Pinch 9/6/2023 9/6/2023   Trials Left Right   1 21 20     Special Tests   Pain Report:  - none    + mild    ++ moderate    +++ severe    9/6/2023   Finkelsteins + with passive flexion of thumb MP joint   WHAT test ++   * Patient reports a history of clicking in the 1st dorsal compartment and intersection of 1st and 2nd dorsal comparments.      Assessment & Plan   CLINICAL IMPRESSIONS  Medical Diagnosis: Right wrist pain    Treatment Diagnosis: Right wrist pain    Impression/Assessment: Pt is a 37 year old male presenting to Occupational Therapy due to the above condition. Symptoms and provocative testing are consistent with De Quervain's tenosynovitis.  The following significant findings have been identified: Impaired ROM and Pain.  These identified deficits interfere with their ability to perform self care tasks, work tasks, recreational activities, household chores, and meal planning and preparation as compared to previous level of function.   Patient's limitations or Problem List includes: Pain, Decreased ROM/motion, and Increased edema of the right wrist and thumb which interferes with the patient's ability to perform  Self Care Tasks (dressing), Work Tasks, Recreational Activities, Household Chores, and Driving  as compared to previous level of function.    Clinical Decision Making (Complexity):  Assessment of Occupational Performance: 5 or more Performance Deficits  Occupational Performance Limitations: bathing/showering, dressing, hygiene and grooming, driving and community mobility, home establishment and management, meal preparation and cleanup, shopping, work, and leisure activities  Clinical Decision Making (Complexity): Low complexity    PLAN OF CARE  Treatment Interventions:  Modalities:  US  Therapeutic Exercise:  AROM, PROM, Tendon Gliding, and Isotonics  Neuromuscular re-education:  Nerve Gliding and Kinesiotaping  Manual Techniques:  Joint mobilization, Friction massage, and Myofascial release  Orthotic Fabrication:  Forearm-based thumb spica orthosis    Long Term Goals   OT Goal 1  Goal Identifier: ADL  Goal Description: Patient able to pull up pants using right hand with 0/10 wrist pain.  Rationale: In order to maximize safety and independence with performance of self-care activities  Target Date: 10/18/23  OT Goal 2  Goal Identifier: Leisure  Goal Description: Patient able to perform backstroke with 1/10 wrist pain or less  Target Date: 10/18/23      Frequency of Treatment: 1x/week  Duration of Treatment: 6 weeks     Recommended Referrals to Other Professionals:  N/A  Education Assessment: Learner/Method: Patient     Risks and benefits of evaluation/treatment have been explained.   Patient/Family/caregiver agrees with Plan of Care.     Evaluation Time:    OT Eval, Low Complexity Minutes (11220): 30       Signing Clinician: Renetta Cali OT

## 2023-09-13 ENCOUNTER — THERAPY VISIT (OUTPATIENT)
Dept: OCCUPATIONAL THERAPY | Facility: CLINIC | Age: 38
End: 2023-09-13
Payer: COMMERCIAL

## 2023-09-13 DIAGNOSIS — M25.531 RIGHT WRIST PAIN: Primary | ICD-10-CM

## 2023-09-13 PROCEDURE — 97110 THERAPEUTIC EXERCISES: CPT | Mod: GO | Performed by: OCCUPATIONAL THERAPIST

## 2023-09-20 ENCOUNTER — THERAPY VISIT (OUTPATIENT)
Dept: OCCUPATIONAL THERAPY | Facility: CLINIC | Age: 38
End: 2023-09-20
Payer: COMMERCIAL

## 2023-09-20 DIAGNOSIS — M25.531 RIGHT WRIST PAIN: Primary | ICD-10-CM

## 2023-09-20 PROCEDURE — 97110 THERAPEUTIC EXERCISES: CPT | Mod: GO | Performed by: OCCUPATIONAL THERAPIST

## 2023-09-20 NOTE — PROGRESS NOTES
SOAP note objective information for 9/20/2023.    Pain Level (Scale 0-10):   9/6/2023   At Rest 0/10   With Use 5/10     Pain Description:  Date 9/6/2023   Location Radial aspect of wrist and thumb   Pain Quality Sharp   Frequency intermittent     Pain is worst  daytime   Exacerbated by Ulnar deviation of wrist; pushing through wrist, pulling up pants, putting on seatbelt, texting, typing   Relieved by Wearing brace, NSAIDs   Progression Gradually improving     Sensation   WNL throughout all nerve distributions; per patient report.    Edema   - none  + mild    ++ moderate    +++ severe    9/6/2023   1st DC Tender   Radial Styloid Tender      ROM  Pain Report: - none  + mild    ++ moderate    +++ severe   Thumb 9/6/2023 9/6/2023 9/20/2023   AROM (PROM) Left Right Right   MP 0/37 0/29 0/30   IP 0/80 0/68 0/90   RABD 65 57 60   PABD 60 53 53   Opposition Small finger MCP joint crease Tip of small finger Small finger PIP joint     Wrist 9/6/2023 9/6/2023 9/20/2023   AROM (PROM) Left Right Right   Extension 75 63, tightness in radial wrist 65   Flexion 77 75, tightness in radial wrist 80   RD 20 25, pressure in 1st dorsal compartment 25   UD 40 15+ 25     Resisted Testing  Pain Report: - none  + mild    ++ moderate    +++ severe    9/6/2023   APL -   EPB -   EPL -   FCR NT     Strength   (Measured in pounds)  Pain Report: - none  + mild    ++ moderate    +++ severe    9/6/2023 9/6/2023   Trials Left Right   1 129 104     Lat Pinch 9/6/2023 9/6/2023   Trials Left Right   1 28 27     3 Pt Pinch 9/6/2023 9/6/2023   Trials Left Right   1 21 20     Special Tests   Pain Report:  - none    + mild    ++ moderate    +++ severe    9/6/2023 9/20/2023   Finkelsteins + with passive flexion of thumb MP joint +   WHAT test ++ +

## 2023-10-02 ENCOUNTER — ALLIED HEALTH/NURSE VISIT (OUTPATIENT)
Dept: FAMILY MEDICINE | Facility: CLINIC | Age: 38
End: 2023-10-02
Payer: COMMERCIAL

## 2023-10-02 DIAGNOSIS — Z23 ENCOUNTER FOR IMMUNIZATION: Primary | ICD-10-CM

## 2023-10-02 NOTE — PROGRESS NOTES
Prior to immunization administration, verified patients identity using patient s name and date of birth. Please see Immunization Activity for additional information.     Screening Questionnaire for Adult Immunization    Are you sick today?   No   Do you have allergies to medications, food, a vaccine component or latex?   No   Have you ever had a serious reaction after receiving a vaccination?   No   Do you have a long-term health problem with heart, lung, kidney, or metabolic disease (e.g., diabetes), asthma, a blood disorder, no spleen, complement component deficiency, a cochlear implant, or a spinal fluid leak?  Are you on long-term aspirin therapy?   No   Do you have cancer, leukemia, HIV/AIDS, or any other immune system problem?   No   Do you have a parent, brother, or sister with an immune system problem?   No   In the past 3 months, have you taken medications that affect  your immune system, such as prednisone, other steroids, or anticancer drugs; drugs for the treatment of rheumatoid arthritis, Crohn s disease, or psoriasis; or have you had radiation treatments?   No   Have you had a seizure, or a brain or other nervous system problem?   No   During the past year, have you received a transfusion of blood or blood    products, or been given immune (gamma) globulin or antiviral drug?   No   For women: Are you pregnant or is there a chance you could become       pregnant during the next month?   No   Have you received any vaccinations in the past 4 weeks?   No     Immunization questionnaire answers were all negative.    I have reviewed the following standing orders:   This patient is due and qualifies for a TDAP vaccine.    Click here for Tdap Standing Order    I have reviewed the vaccines inclusion and exclusion criteria; No concerns regarding eligibility.         Patient instructed to remain in clinic for 15 minutes afterwards, and to report any adverse reactions. Patient decided to leave after administering  vaccine.     Screening performed by Angelique Ruiz, EMT on 10/2/2023 at 8:17 AM.

## 2023-10-04 ENCOUNTER — THERAPY VISIT (OUTPATIENT)
Dept: OCCUPATIONAL THERAPY | Facility: CLINIC | Age: 38
End: 2023-10-04
Payer: COMMERCIAL

## 2023-10-04 DIAGNOSIS — M25.531 RIGHT WRIST PAIN: Primary | ICD-10-CM

## 2023-10-04 PROCEDURE — 97140 MANUAL THERAPY 1/> REGIONS: CPT | Mod: GO | Performed by: OCCUPATIONAL THERAPIST

## 2023-10-09 NOTE — TELEPHONE ENCOUNTER
DIAGNOSIS: vein issues/self/bcbs/ortho cons    APPOINTMENT DATE: 10/23/23   NOTES STATUS DETAILS   OFFICE NOTE from referring provider Internal self   MEDICATION LIST Internal

## 2023-10-12 DIAGNOSIS — M65.4 DE QUERVAIN'S DISEASE (TENOSYNOVITIS): Primary | ICD-10-CM

## 2023-10-18 ENCOUNTER — TELEPHONE (OUTPATIENT)
Dept: FAMILY MEDICINE | Facility: CLINIC | Age: 38
End: 2023-10-18

## 2023-10-20 DIAGNOSIS — M65.4 TENDINITIS, DE QUERVAIN'S: Primary | ICD-10-CM

## 2023-10-20 DIAGNOSIS — M25.531 RIGHT WRIST PAIN: ICD-10-CM

## 2023-10-23 ENCOUNTER — ANCILLARY PROCEDURE (OUTPATIENT)
Dept: GENERAL RADIOLOGY | Facility: CLINIC | Age: 38
End: 2023-10-23
Attending: FAMILY MEDICINE
Payer: COMMERCIAL

## 2023-10-23 ENCOUNTER — PRE VISIT (OUTPATIENT)
Dept: ORTHOPEDICS | Facility: CLINIC | Age: 38
End: 2023-10-23

## 2023-10-23 ENCOUNTER — OFFICE VISIT (OUTPATIENT)
Dept: ORTHOPEDICS | Facility: CLINIC | Age: 38
End: 2023-10-23
Payer: COMMERCIAL

## 2023-10-23 DIAGNOSIS — M25.531 RIGHT WRIST PAIN: Primary | ICD-10-CM

## 2023-10-23 DIAGNOSIS — M65.4 TENDINITIS, DE QUERVAIN'S: ICD-10-CM

## 2023-10-23 PROCEDURE — 99203 OFFICE O/P NEW LOW 30 MIN: CPT | Mod: 25 | Performed by: FAMILY MEDICINE

## 2023-10-23 PROCEDURE — 73110 X-RAY EXAM OF WRIST: CPT | Mod: RT | Performed by: RADIOLOGY

## 2023-10-23 PROCEDURE — 76942 ECHO GUIDE FOR BIOPSY: CPT | Performed by: FAMILY MEDICINE

## 2023-10-23 PROCEDURE — 20550 NJX 1 TENDON SHEATH/LIGAMENT: CPT | Mod: RT | Performed by: FAMILY MEDICINE

## 2023-10-23 RX ORDER — BETAMETHASONE SODIUM PHOSPHATE AND BETAMETHASONE ACETATE 3; 3 MG/ML; MG/ML
6 INJECTION, SUSPENSION INTRA-ARTICULAR; INTRALESIONAL; INTRAMUSCULAR; SOFT TISSUE
Status: SHIPPED | OUTPATIENT
Start: 2023-10-23

## 2023-10-23 RX ORDER — LIDOCAINE HYDROCHLORIDE 10 MG/ML
1 INJECTION, SOLUTION EPIDURAL; INFILTRATION; INTRACAUDAL; PERINEURAL
Status: SHIPPED | OUTPATIENT
Start: 2023-10-23

## 2023-10-23 RX ADMIN — BETAMETHASONE SODIUM PHOSPHATE AND BETAMETHASONE ACETATE 6 MG: 3; 3 INJECTION, SUSPENSION INTRA-ARTICULAR; INTRALESIONAL; INTRAMUSCULAR; SOFT TISSUE at 08:00

## 2023-10-23 RX ADMIN — LIDOCAINE HYDROCHLORIDE 1 ML: 10 INJECTION, SOLUTION EPIDURAL; INFILTRATION; INTRACAUDAL; PERINEURAL at 08:00

## 2023-10-23 NOTE — LETTER
10/23/2023      RE: Moy Capone  [address]  [facility] [address]     Dear Colleague,    Thank you for referring your patient, Moy Capone, to the [facility] SP[facility]. Please see a copy of my visit note below.      [facility]  SPORTS & ORTHOPEDIC CLINIC VISIT     Oct 23, 2023        ASSESSMENT & PLAN    38-year-old de Quervain's tenosynovitis of the right wrist that has been refractory to splinting, oral anti-inflammatory, and hand therapy    Reviewed imaging and assessment with patient in detail  Discussed indication for steroid injection.  Patient would like to pursue this option today.  See procedure note for details.  Routine postinjection instructions were given.  Would recommend weaning out of the brace as able as his symptoms improved.  Continue with hand therapy.  Follow-up if symptoms do not resolve completely with current injection    Shankar Meek MD  [facility]    -----  Chief Complaint   Patient presents with    Right Wrist - Pain       SUBJECTIVE  Moy Capone is a/an 38 year old male who is seen in consultation at the request of hand therapy for evaluation of  right wrist pain.     The patient is seen by themselves.  The patient is Right handed    Onset: 3 month(s) ago. Patient describes injury as carrying a 200 pound box under one arm and went to put it down and had a pain in his thumb and wrist   Location of Pain: right wrist radial side  Worsened by: ROM, supination, chopping, picking up a pan, typing and texting   Better with: NA   Treatments tried: Advil, Hand therapy (4 visits), brace  Associated symptoms: no distal numbness or tingling; denies swelling or warmth    Orthopedic/Surgical history: NO  Social History/Occupation: Self employed       REVIEW OF SYSTEMS:  Do you have fever, chills, weight loss? No  Do you have any vision problems? No  Do you have any chest pain or  edema? No  Do you have any shortness of breath or wheezing?  No  Do you have stomach problems? No  Do you have any numbness or focal weakness? No  Do you have diabetes? No  Do you have problems with bleeding or clotting? No  Do you have an rashes or other skin lesions? No    OBJECTIVE:  There were no vitals taken for this visit.     General  - alert, pleasant, no distress  CV  - normal radial pulse, cap refill brisk  Musculoskeletal - wrist  - inspection: normal joint alignment, no obvious deformity, no swelling  - palpation: ttp over radial styloid. Otherwise no bony or soft tissue tenderness, no tenderness at the anatomical snuffbox  - ROM:  90 deg flexion   70 deg extension   25 deg abduction   65 deg adduction  - strength: 5/5  strength, 5/5 flexion, extension, pronation, supination, adduction, abduction  - special tests:  (-) Tinel's  (+) Finkelstein  (-) Phalen  (-) Ron click test  (-) ulnar impaction  Neuro  - no sensory or motor deficit, grossly normal coordination, normal muscle tone  Skin  - no ecchymosis, erythema, warmth, or induration, no obvious rash          RADIOLOGY:    Three-view x-rays of right wrist were performed and reviewed independently Raquel no acute fracture or dislocation.  No significant DJD.  See EMR for formal radiology report.        Ultrasound-guided right first dorsal compartment injection    Date/Time: 10/23/2023 8:00 AM    Performed by: Shankar Meek MD  Authorized by: Shankar Meek MD    Indications:  Pain and tendon swelling  Needle Size:  25 G  Guidance: ultrasound    Condition: de Quervain's      Site:  R extensor compartment 1  Medications:  6 mg betamethasone acet & sod phos 6 (3-3) MG/ML; 1 mL lidocaine (PF) 1 %  Outcome:  Tolerated well, no immediate complications  Procedure discussed: discussed risks, benefits, and alternatives    Consent Given by:  Patient  Timeout: timeout called immediately prior to procedure    Prep: patient was prepped  and draped in usual sterile fashion     Patient was positioned with the right hand in a neutral position on exam table.  The area overlying the first dorsal compartment was cleaned with chlorhexidine swab.  Next using ultrasound guidance the APL and EPB tendons was identified.  Ultrasound was necessary to ensure proper placement of the steroid medication within the sheath of the first dorsal compartment.  The skin was anesthetized with ethyl chloride spray.  Next using direct and continuous ultrasound guidance a 25-gauge needle was introduced into the tendon sheath.  A solution of 6 mg Celestone and 1 mL 1% lidocaine was injected and seen flowing around the tendons.  Images were captured and saved to the permanent record.  The area was covered with a bandage.  The patient tolerated the procedure well.  There were no immediate complications.  Routine postinjection instructions were given to the patient.  Recommended follow-up should any significant increase in pain, redness, swelling or drainage from the injection site develop.    Shankar Meek MD

## 2023-10-23 NOTE — PROGRESS NOTES
[facility]  SPORTS & ORTHOPEDIC CLINIC VISIT     Oct 23, 2023        ASSESSMENT & PLAN    38-year-old de Quervain's tenosynovitis of the right wrist that has been refractory to splinting, oral anti-inflammatory, and hand therapy    Reviewed imaging and assessment with patient in detail  Discussed indication for steroid injection.  Patient would like to pursue this option today.  See procedure note for details.  Routine postinjection instructions were given.  Would recommend weaning out of the brace as able as his symptoms improved.  Continue with hand therapy.  Follow-up if symptoms do not resolve completely with current injection    Shankar Meek MD  [facility] SP[facility] MEDIC[facility]    -----  Chief Complaint   Patient presents with    Right Wrist - Pain       SUBJECTIVE  Moy Capone is a/an 38 year old male who is seen in consultation at the request of hand therapy for evaluation of  right wrist pain.     The patient is seen by themselves.  The patient is Right handed    Onset: 3 month(s) ago. Patient describes injury as carrying a 200 pound box under one arm and went to put it down and had a pain in his thumb and wrist   Location of Pain: right wrist radial side  Worsened by: ROM, supination, chopping, picking up a pan, typing and texting   Better with: NA   Treatments tried: Advil, Hand therapy (4 visits), brace  Associated symptoms: no distal numbness or tingling; denies swelling or warmth    Orthopedic/Surgical history: NO  Social History/Occupation: Self employed       REVIEW OF SYSTEMS:  Do you have fever, chills, weight loss? No  Do you have any vision problems? No  Do you have any chest pain or edema? No  Do you have any shortness of breath or wheezing?  No  Do you have stomach problems? No  Do you have any numbness or focal weakness? No  Do you have diabetes? No  Do you have problems with bleeding or clotting? No  Do you have an rashes or other skin lesions?  No    OBJECTIVE:  There were no vitals taken for this visit.     General  - alert, pleasant, no distress  CV  - normal radial pulse, cap refill brisk  Musculoskeletal - wrist  - inspection: normal joint alignment, no obvious deformity, no swelling  - palpation: ttp over radial styloid. Otherwise no bony or soft tissue tenderness, no tenderness at the anatomical snuffbox  - ROM:  90 deg flexion   70 deg extension   25 deg abduction   65 deg adduction  - strength: 5/5  strength, 5/5 flexion, extension, pronation, supination, adduction, abduction  - special tests:  (-) Tinel's  (+) Finkelstein  (-) Phalen  (-) Ron click test  (-) ulnar impaction  Neuro  - no sensory or motor deficit, grossly normal coordination, normal muscle tone  Skin  - no ecchymosis, erythema, warmth, or induration, no obvious rash          RADIOLOGY:    Three-view x-rays of right wrist were performed and reviewed independently Raquel no acute fracture or dislocation.  No significant DJD.  See EMR for formal radiology report.        Ultrasound-guided right first dorsal compartment injection    Date/Time: 10/23/2023 8:00 AM    Performed by: Shankar Meek MD  Authorized by: Shankar Meek MD    Indications:  Pain and tendon swelling  Needle Size:  25 G  Guidance: ultrasound    Condition: de Quervain's      Site:  R extensor compartment 1  Medications:  6 mg betamethasone acet & sod phos 6 (3-3) MG/ML; 1 mL lidocaine (PF) 1 %  Outcome:  Tolerated well, no immediate complications  Procedure discussed: discussed risks, benefits, and alternatives    Consent Given by:  Patient  Timeout: timeout called immediately prior to procedure    Prep: patient was prepped and draped in usual sterile fashion     Patient was positioned with the right hand in a neutral position on exam table.  The area overlying the first dorsal compartment was cleaned with chlorhexidine swab.  Next using ultrasound guidance the APL and EPB tendons was  identified.  Ultrasound was necessary to ensure proper placement of the steroid medication within the sheath of the first dorsal compartment.  The skin was anesthetized with ethyl chloride spray.  Next using direct and continuous ultrasound guidance a 25-gauge needle was introduced into the tendon sheath.  A solution of 6 mg Celestone and 1 mL 1% lidocaine was injected and seen flowing around the tendons.  Images were captured and saved to the permanent record.  The area was covered with a bandage.  The patient tolerated the procedure well.  There were no immediate complications.  Routine postinjection instructions were given to the patient.  Recommended follow-up should any significant increase in pain, redness, swelling or drainage from the injection site develop.    Shankar Meek MD

## 2023-10-23 NOTE — NURSING NOTE
[address] [address]  Dept: [phone]  ______________________________________________________________________________    Patient: Moy Capone   : 1985   MRN: [id number]   2023    INVASIVE PROCEDURE SAFETY CHECKLIST    Date: 2023   Procedure:R Gerson Skinner's CSI   Patient Name: Moy Capone  MRN: [id number]  YOB: 1985    Action: Complete sections as appropriate. Any discrepancy results in a HARD COPY until resolved.     PRE PROCEDURE:  Patient ID verified with 2 identifiers (name and  or MRN): Yes  Procedure and site verified with patient/designee (when able): Yes  Accurate consent documentation in medical record: Yes  H&P (or appropriate assessment) documented in medical record: Yes  H&P must be up to 20 days prior to procedure and updates within 24 hours of procedure as applicable: Yes  Relevant diagnostic and radiology test results appropriately labeled and displayed as applicable: Yes  Procedure site(s) marked with provider initials: NA    TIMEOUT:  Time-Out performed immediately prior to starting procedure, including verbal and active participation of all team members addressing the following:Yes  * Correct patient identify  * Confirmed that the correct side and site are marked  * An accurate procedure consent form  * Agreement on the procedure to be done  * Correct patient position  * Relevant images and results are properly labeled and appropriately displayed  * The need to administer antibiotics or fluids for irrigation purposes during the procedure as applicable   * Safety precautions based on patient history or medication use    DURING PROCEDURE: Verification of correct person, site, and procedures any time the responsibility for care of the patient is transferred to another member of the care team.       Prior to injection, verified patient identity using patient's name and date of  birth.  Due to injection administration, patient instructed to remain in clinic for 15 minutes  afterwards, and to report any adverse reaction to me immediately.    Tendon sheath injection was performed.     Drug Amount Wasted:  Yes: 4 mg/ml   Vial/Syringe: Single dose vial  Expiration Date:  7/1/24      Vikki Vaca ATC  October 23, 2023

## 2024-01-18 ENCOUNTER — OFFICE VISIT (OUTPATIENT)
Dept: DERMATOLOGY | Facility: CLINIC | Age: 39
End: 2024-01-18
Payer: COMMERCIAL

## 2024-01-18 DIAGNOSIS — L40.9 PSORIASIS: ICD-10-CM

## 2024-01-18 DIAGNOSIS — L57.8 PHOTOAGING OF SKIN: Primary | ICD-10-CM

## 2024-01-18 PROCEDURE — 99204 OFFICE O/P NEW MOD 45 MIN: CPT | Mod: GC | Performed by: DERMATOLOGY

## 2024-01-18 RX ORDER — TRETINOIN 0.25 MG/G
CREAM TOPICAL AT BEDTIME
Qty: 45 G | Refills: 3 | Status: SHIPPED | OUTPATIENT
Start: 2024-01-18

## 2024-01-18 ASSESSMENT — PAIN SCALES - GENERAL: PAINLEVEL: NO PAIN (0)

## 2024-01-18 NOTE — LETTER
1/18/2024       RE: Moy Capone  [address]  [facility] [address]     Dear Colleague,    Thank you for referring your patient, Moy Capone, to the [facility] DERMATOLOGY CLINIC [place] at [facility]. Please see a copy of my visit note below.    [facility] Dermatology Note  Encounter Date: Jan 18, 2024  Office Visit     Dermatology Problem List:  1. Psoriasis, scalp and body  Chronic and well controlled w tacrolimus ointment prn.   - tacrolimus 0.1% ointment BID PRN to plaques (<10% BSA)  2. Icthyosis Vulgaris  - vanicream bid to lower extremities  3. Strong family hx of BCC starting at mid 30s  4. Concerns for photo aging  - tretinoin 0.025% nightly, sunscreen daily    ____________________________________________    Assessment & Plan:   # Psoriasis, scalp and body  Chronic problem, and well controlled with tacrolimus bid to active flares. Has small persistent plaque on R elbow.   - tacrolimus 0.1% ointment BID PRN to plaques     # Icthyosis Vulgaris  Primarily of lower extremities.   - vanicream bid to lower extremities    # Strong family hx of BCC starting at mid 30s  No spots of concern at today's visit. Father had 75+ BCCs. Brother had BCC at age of mid-30s.  - continue self exams at home  - follow-up in 12 months for skin exam per preference    # Concerns for photo aging and mild acne vulgaris.  Pt has proactive skincare regimen of otc retinol. Interested in stronger prescription if possible.   - Start tretinoin 0.025% nightly  - reviewed sun protection measures (i.e. protective clothing and broad spectrum sunscreen).     # Hyperpigmentation of b/l anterior lower shins  Pt has chronic brown pigmented macules on anterior shins. Was previously diagnosed w schambergs benign purpura. At today's visit, looks more like chronic actinic damage. Reassured of benign etiology and discussed general sun protection.   - could trial  retinol to shins w diligent sun protection  - moisturizer per above    Follow-up: 1 year(s) in-person, or earlier for new or changing lesions    Staff and Resident:     I, Vikki Evangelista, saw and staffed this patient with the attending. All recommendations, therapies and procedures were pre-staffed with the attending or administered with direct supervision.     I have seen and examined this patient and agree with the assessment and plan as documented in the resident's note.    Manav Gee MD  Dermatology Attending      ____________________________________________    CC: Skin Check (Moy is here today for a skin check )    HPI:  Mr. Moy Capone is a(n) 38 year old male who presents today as a new patient for skin check. (Over 3 years since last visit)    -Has strong family hx of BCC (grandfather with 75+ BCCs)  -Lots of sun exposure growing up  -Swim-, recently retired  -Was living in [place] for a time, recently came back    Patient is otherwise feeling well, without additional skin concerns.    Labs Reviewed:  N/A    Physical Exam:  Vitals: There were no vitals taken for this visit.  SKIN: Total skin excluding the undergarment areas was performed. The exam included the head/face, neck, both arms, chest, back, abdomen, both legs, digits and/or nails.   - clusters of pigmented macules on anterior shins  - xerosis w superficial geometric scaling of b/l lower extremities  - dermal nevi x 3 on face  - evidence of chronic sun exposure, early sun damage  - Scattered brown macules on sun exposed areas.  - No other lesions of concern on areas examined.     Medications:  Current Outpatient Medications   Medication    tacrolimus (PROTOPIC) 0.1 % external ointment     Current Facility-Administered Medications   Medication    betamethasone acet & sod phos (CELESTONE) injection 6 mg    lidocaine (PF) (XYLOCAINE) 1 % injection 1 mL      Past Medical History:   Patient Active Problem List   Diagnosis    Dermatitis,  seborrheic    Right wrist pain     Past Medical History:   Diagnosis Date    Eczema        CC No referring provider defined for this encounter. on close of this encounter.

## 2024-01-18 NOTE — PATIENT INSTRUCTIONS
Ordered tretinoin 0.025%. When using, please use sunscreen and diligent sun protection the day after. If sunscreen, re-apply every 2 hours when in the sun.    For the lower legs, we recommend cereve or vanicream (in a tub).       Sun screens with Iron Oxide      Skinceuticals sunscreen, fusion, carried at the [facility] or can be mailed to you by [facility] by calling [facility]:Call [phone] and ask to have product shipped to you.     Supergoop 100% Mineral CC cream 50    [employer] Juliette Tinted Face Mineral sunscreen 60    ISDIN Eryfotona Ageless Ultralight Tinted Mineral

## 2024-01-18 NOTE — NURSING NOTE
Dermatology Rooming Note    Moy Capone's goals for this visit include:   Chief Complaint   Patient presents with    Skin Check     Moy is here today for a skin check      ELVER Bowie

## 2024-01-18 NOTE — PROGRESS NOTES
[facility] Dermatology Note  Encounter Date: Jan 18, 2024  Office Visit     Dermatology Problem List:  1. Psoriasis, scalp and body  Chronic and well controlled w tacrolimus ointment prn.   - tacrolimus 0.1% ointment BID PRN to plaques (<10% BSA)  2. Icthyosis Vulgaris  - vanicream bid to lower extremities  3. Strong family hx of BCC starting at mid 30s  4. Concerns for photo aging  - tretinoin 0.025% nightly, sunscreen daily    ____________________________________________    Assessment & Plan:   # Psoriasis, scalp and body  Chronic problem, and well controlled with tacrolimus bid to active flares. Has small persistent plaque on R elbow.   - tacrolimus 0.1% ointment BID PRN to plaques     # Icthyosis Vulgaris  Primarily of lower extremities.   - vanicream bid to lower extremities    # Strong family hx of BCC starting at mid 30s  No spots of concern at today's visit. Father had 75+ BCCs. Brother had BCC at age of mid-30s.  - continue self exams at home  - follow-up in 12 months for skin exam per preference    # Concerns for photo aging and mild acne vulgaris.  Pt has proactive skincare regimen of otc retinol. Interested in stronger prescription if possible.   - Start tretinoin 0.025% nightly  - reviewed sun protection measures (i.e. protective clothing and broad spectrum sunscreen).     # Hyperpigmentation of b/l anterior lower shins  Pt has chronic brown pigmented macules on anterior shins. Was previously diagnosed w schambergs benign purpura. At today's visit, looks more like chronic actinic damage. Reassured of benign etiology and discussed general sun protection.   - could trial retinol to shins w diligent sun protection  - moisturizer per above    Follow-up: 1 year(s) in-person, or earlier for new or changing lesions    Staff and Resident:     Vikki VILLALBA, saw and staffed this patient with the attending. All recommendations, therapies and procedures were pre-staffed with the attending or  administered with direct supervision.     I have seen and examined this patient and agree with the assessment and plan as documented in the resident's note.    Manav Gee MD  Dermatology Attending      ____________________________________________    CC: Skin Check (Moy is here today for a skin check )    HPI:  Mr. Moy Capone is a(n) 38 year old male who presents today as a new patient for skin check. (Over 3 years since last visit)    -Has strong family hx of BCC (grandfather with 75+ BCCs)  -Lots of sun exposure growing up  -Swim-, recently retired  -Was living in [place] for a time, recently came back    Patient is otherwise feeling well, without additional skin concerns.    Labs Reviewed:  N/A    Physical Exam:  Vitals: There were no vitals taken for this visit.  SKIN: Total skin excluding the undergarment areas was performed. The exam included the head/face, neck, both arms, chest, back, abdomen, both legs, digits and/or nails.   - clusters of pigmented macules on anterior shins  - xerosis w superficial geometric scaling of b/l lower extremities  - dermal nevi x 3 on face  - evidence of chronic sun exposure, early sun damage  - Scattered brown macules on sun exposed areas.  - No other lesions of concern on areas examined.     Medications:  Current Outpatient Medications   Medication     tacrolimus (PROTOPIC) 0.1 % external ointment     Current Facility-Administered Medications   Medication     betamethasone acet & sod phos (CELESTONE) injection 6 mg     lidocaine (PF) (XYLOCAINE) 1 % injection 1 mL      Past Medical History:   Patient Active Problem List   Diagnosis     Dermatitis, seborrheic     Right wrist pain     Past Medical History:   Diagnosis Date     Eczema        CC No referring provider defined for this encounter. on close of this encounter.

## 2024-01-29 PROBLEM — L40.9 PSORIASIS: Status: ACTIVE | Noted: 2024-01-29

## 2024-01-29 PROBLEM — L57.8 PHOTOAGING OF SKIN: Status: ACTIVE | Noted: 2024-01-29

## 2024-01-31 ENCOUNTER — ANCILLARY PROCEDURE (OUTPATIENT)
Dept: GENERAL RADIOLOGY | Facility: CLINIC | Age: 39
End: 2024-01-31
Attending: NURSE PRACTITIONER
Payer: COMMERCIAL

## 2024-01-31 ENCOUNTER — OFFICE VISIT (OUTPATIENT)
Dept: FAMILY MEDICINE | Facility: CLINIC | Age: 39
End: 2024-01-31
Payer: COMMERCIAL

## 2024-01-31 VITALS
TEMPERATURE: 98.3 F | BODY MASS INDEX: 31.34 KG/M2 | OXYGEN SATURATION: 96 % | HEIGHT: 72 IN | DIASTOLIC BLOOD PRESSURE: 90 MMHG | WEIGHT: 231.4 LBS | HEART RATE: 72 BPM | RESPIRATION RATE: 18 BRPM | SYSTOLIC BLOOD PRESSURE: 137 MMHG

## 2024-01-31 DIAGNOSIS — R06.09 OTHER FORM OF DYSPNEA: ICD-10-CM

## 2024-01-31 DIAGNOSIS — Z00.00 ROUTINE HISTORY AND PHYSICAL EXAMINATION OF ADULT: Primary | ICD-10-CM

## 2024-01-31 DIAGNOSIS — R05.1 ACUTE COUGH: ICD-10-CM

## 2024-01-31 DIAGNOSIS — R06.2 WHEEZING: ICD-10-CM

## 2024-01-31 LAB
BASOPHILS # BLD AUTO: 0.1 10E3/UL (ref 0–0.2)
BASOPHILS NFR BLD AUTO: 2 %
EOSINOPHIL # BLD AUTO: 0.9 10E3/UL (ref 0–0.7)
EOSINOPHIL NFR BLD AUTO: 16 %
ERYTHROCYTE [DISTWIDTH] IN BLOOD BY AUTOMATED COUNT: 12.2 % (ref 10–15)
HBA1C MFR BLD: 5.8 %
HCT VFR BLD AUTO: 43.5 % (ref 40–53)
HGB BLD-MCNC: 14.5 G/DL (ref 13.3–17.7)
IMM GRANULOCYTES # BLD: 0 10E3/UL
IMM GRANULOCYTES NFR BLD: 0 %
LYMPHOCYTES # BLD AUTO: 2.4 10E3/UL (ref 0.8–5.3)
LYMPHOCYTES NFR BLD AUTO: 43 %
MCH RBC QN AUTO: 28.3 PG (ref 26.5–33)
MCHC RBC AUTO-ENTMCNC: 33.3 G/DL (ref 31.5–36.5)
MCV RBC AUTO: 85 FL (ref 78–100)
MONOCYTES # BLD AUTO: 0.3 10E3/UL (ref 0–1.3)
MONOCYTES NFR BLD AUTO: 6 %
NEUTROPHILS # BLD AUTO: 1.9 10E3/UL (ref 1.6–8.3)
NEUTROPHILS NFR BLD AUTO: 33 %
NRBC # BLD AUTO: 0 10E3/UL
NRBC BLD AUTO-RTO: 0 /100
PLATELET # BLD AUTO: 230 10E3/UL (ref 150–450)
RBC # BLD AUTO: 5.13 10E6/UL (ref 4.4–5.9)
WBC # BLD AUTO: 5.7 10E3/UL (ref 4–11)

## 2024-01-31 PROCEDURE — 80053 COMPREHEN METABOLIC PANEL: CPT | Mod: ORL | Performed by: NURSE PRACTITIONER

## 2024-01-31 PROCEDURE — 85025 COMPLETE CBC W/AUTO DIFF WBC: CPT | Mod: ORL | Performed by: NURSE PRACTITIONER

## 2024-01-31 PROCEDURE — 84443 ASSAY THYROID STIM HORMONE: CPT | Mod: ORL | Performed by: NURSE PRACTITIONER

## 2024-01-31 PROCEDURE — 80061 LIPID PANEL: CPT | Mod: ORL | Performed by: NURSE PRACTITIONER

## 2024-01-31 PROCEDURE — 71046 X-RAY EXAM CHEST 2 VIEWS: CPT | Mod: GC | Performed by: RADIOLOGY

## 2024-01-31 PROCEDURE — 83036 HEMOGLOBIN GLYCOSYLATED A1C: CPT | Mod: ORL | Performed by: NURSE PRACTITIONER

## 2024-01-31 RX ORDER — ALBUTEROL SULFATE 90 UG/1
2 AEROSOL, METERED RESPIRATORY (INHALATION) EVERY 6 HOURS PRN
Qty: 18 G | Refills: 3 | Status: SHIPPED | OUTPATIENT
Start: 2024-01-31

## 2024-01-31 SDOH — HEALTH STABILITY: PHYSICAL HEALTH: ON AVERAGE, HOW MANY DAYS PER WEEK DO YOU ENGAGE IN MODERATE TO STRENUOUS EXERCISE (LIKE A BRISK WALK)?: 6 DAYS

## 2024-01-31 SDOH — HEALTH STABILITY: PHYSICAL HEALTH: ON AVERAGE, HOW MANY MINUTES DO YOU ENGAGE IN EXERCISE AT THIS LEVEL?: 90 MIN

## 2024-01-31 ASSESSMENT — ANXIETY QUESTIONNAIRES
4. TROUBLE RELAXING: NOT AT ALL
7. FEELING AFRAID AS IF SOMETHING AWFUL MIGHT HAPPEN: NOT AT ALL
8. IF YOU CHECKED OFF ANY PROBLEMS, HOW DIFFICULT HAVE THESE MADE IT FOR YOU TO DO YOUR WORK, TAKE CARE OF THINGS AT HOME, OR GET ALONG WITH OTHER PEOPLE?: NOT DIFFICULT AT ALL
3. WORRYING TOO MUCH ABOUT DIFFERENT THINGS: NOT AT ALL
IF YOU CHECKED OFF ANY PROBLEMS ON THIS QUESTIONNAIRE, HOW DIFFICULT HAVE THESE PROBLEMS MADE IT FOR YOU TO DO YOUR WORK, TAKE CARE OF THINGS AT HOME, OR GET ALONG WITH OTHER PEOPLE: NOT DIFFICULT AT ALL
7. FEELING AFRAID AS IF SOMETHING AWFUL MIGHT HAPPEN: NOT AT ALL
5. BEING SO RESTLESS THAT IT IS HARD TO SIT STILL: NOT AT ALL
GAD7 TOTAL SCORE: 0
1. FEELING NERVOUS, ANXIOUS, OR ON EDGE: NOT AT ALL
2. NOT BEING ABLE TO STOP OR CONTROL WORRYING: NOT AT ALL
GAD7 TOTAL SCORE: 0
6. BECOMING EASILY ANNOYED OR IRRITABLE: NOT AT ALL

## 2024-01-31 ASSESSMENT — PAIN SCALES - GENERAL: PAINLEVEL: NO PAIN (0)

## 2024-01-31 ASSESSMENT — SOCIAL DETERMINANTS OF HEALTH (SDOH): HOW OFTEN DO YOU GET TOGETHER WITH FRIENDS OR RELATIVES?: MORE THAN THREE TIMES A WEEK

## 2024-01-31 NOTE — NURSING NOTE
"ROOM:1  EWA JOSHI    Preferred Name: Moy     How did you hear about us?  Current Patient    38 year old  Chief Complaint   Patient presents with     Physical     Patient needs a wellness check/physical. Wants to do blood work. The patient mentions breathing issues that started on Jan 13th. The patient mentions that they had a difficult time breathing on the 14th and 15th of this month. Says that it felt like his lungs were swelling. The patient took at-home covid tests and they all came back negative. The last time the patient took a covid test was this morning.        Blood pressure (!) 150/77, pulse 72, temperature 98.3  F (36.8  C), temperature source Oral, height 1.824 m (5' 11.8\"), weight 105 kg (231 lb 6.4 oz), SpO2 96%. Body mass index is 31.56 kg/m .  BP completed using cuff size:        Patient Active Problem List   Diagnosis     Dermatitis, seborrheic     Right wrist pain     Psoriasis     Photoaging of skin       Wt Readings from Last 2 Encounters:   01/31/24 105 kg (231 lb 6.4 oz)   08/21/23 115.8 kg (255 lb 6.4 oz)     BP Readings from Last 3 Encounters:   01/31/24 (!) 150/77   08/21/23 128/85   11/03/20 (!) 162/95       Allergies   Allergen Reactions     Intal [Cromolyn]        Current Outpatient Medications   Medication     tacrolimus (PROTOPIC) 0.1 % external ointment     tretinoin (RETIN-A) 0.025 % external cream     Current Facility-Administered Medications   Medication     betamethasone acet & sod phos (CELESTONE) injection 6 mg     lidocaine (PF) (XYLOCAINE) 1 % injection 1 mL       Social History     Tobacco Use     Smoking status: Never     Smokeless tobacco: Never   Vaping Use     Vaping Use: Never used   Substance Use Topics     Alcohol use: Yes     Comment: maybe twice alcohol     Drug use: Not Currently     Types: Marijuana     Comment: very seldom       Honoring Choices - Health Care Directive Guide offered to patient at time of visit.    Health Maintenance Due   Topic Date Due " "    YEARLY PREVENTIVE VISIT  Never done     ADVANCE CARE PLANNING  Never done     GLUCOSE  01/22/2023     IPV IMMUNIZATION (2 of 3 - Adult catch-up series) 11/24/2023       Immunization History   Administered Date(s) Administered     COVID-19 12+ (2023-24) (Pfizer) 10/18/2023     COVID-19 MONOVALENT 12+ (Pfizer) 03/11/2021, 04/01/2021     Flu, Unspecified 09/16/2020     HPV9 07/24/2023, 08/24/2023     Hep B, Adult (Heplisav- B) 10/05/2023, 11/17/2023     Hepatitis A (ADULT 19+) 10/05/2023     Influenza Vaccine >6 months,quad, PF 10/18/2023     [place] Encephalitis IM 10/27/2023, 11/30/2023     Poliovirus, inactivated (IPV) 10/27/2023     TDAP (Adacel,Boostrix) 10/02/2023     Typhoid IM 11/30/2023       No results found for: \"PAP\"    Recent Labs   Lab Test 01/22/20  1308   ALT 30   CR 1.01   GFRESTIMATED >90   GFRESTBLACK >90   ALBUMIN 4.3   POTASSIUM 4.3           1/31/2024     8:48 AM 8/21/2023     8:15 AM   PHQ-2 ( 1999 Pfizer)   Q1: Little interest or pleasure in doing things 0 0   Q2: Feeling down, depressed or hopeless 0 0   PHQ-2 Score 0 0   Q1: Little interest or pleasure in doing things Not at all    Q2: Feeling down, depressed or hopeless Not at all    PHQ-2 Score 0            4/17/2019    11:14 AM 9/27/2019     9:29 AM   PHQ-9 SCORE   PHQ-9 Total Score 3 1           4/17/2019    11:14 AM 9/27/2019     9:29 AM 1/31/2024     8:45 AM   IAN-7 SCORE   Total Score   0 (minimal anxiety)   Total Score 0 0 0            No data to display                Rebeka Hatch    January 31, 2024 10:57 AM    "

## 2024-01-31 NOTE — PROGRESS NOTES
Answers for HPI/ROS submitted by the patient on 1/31/2024  IAN 7 TOTAL SCORE: 0       ANNUAL WELLNESS EXAM     Today's Date: Jan 31, 2024     Patient Moy Capone 1985 presents to the clinic today for a preventative health visit.         SUBJECTIVE     History of Present Illness:    Moy has had some respiratory issues since January 13th.  He noticed that at times during exercise, and at times at rest, particularly in the evenind, he feels a sensation in his lungs, like a fullness, then he coughs from a tickle and may wheeze.  This came on suddenly in the 13th, he felt bad with in on the 14th and 15th, he felt almost completely better, had rigorous exercise days without symptoms, then 1-2 weeks ago similar symptoms reoccurred.  He notices wheezing and a cough.  He denies chest pain, he does not have classic shortness of breath with activity, but at times feels he may not be able to take a deep breath.  No fever or upper respiratory symptoms.  When he describes it, it is upper chest bilaterally.      Moy has taken 6 COVID tests over the course of his respiratory symptoms, all negative.      No chest pain (as above,) no radiation into back, arms, neck or jaw.  Moy does not have a strong family history of cardiac events or heart disease.  His father is treated for hypertension, Moy states he is quite obese.      Moy had a diagnosis of asthma as a child and used an albuterol inhaler until he was in high school.    Moy is working for himself right now.  His work is not stressful,  He is leaving to go [place] a [place]-week [place]  Moy intentionally lost about 40 pounds over the past year.      Allergies   Allergen Reactions     Intal [Cromolyn]         Current Outpatient Medications   Medication Instructions     albuterol (PROAIR HFA/PROVENTIL HFA/VENTOLIN HFA) 108 (90 Base) MCG/ACT inhaler 2 puffs, Inhalation, EVERY 6 HOURS PRN     tacrolimus (PROTOPIC) 0.1 % external ointment APPLY  TOPICALLY TWICE DAILY.     tretinoin (RETIN-A) 0.025 % external cream Topical, AT BEDTIME, ALWAYS APPLY SUNSCREEN NEXT DAY (REAPPLY EVERY 2 HOURS)       Past Medical History:   Diagnosis Date     Eczema         Family History   Problem Relation Age of Onset     Hypertension Father      Obesity Father      Alzheimer Disease Maternal Grandfather      Skin Cancer Maternal Grandfather      Melanoma No family hx of         Do you have a first-degree relative with a history of the following:  A. Cancer of the prostate or intestine - No  B. Heart attack, heart pain, or stroke before the age of 55 - No  C. Unexplained death from drowning or car accident - No    Social History     Tobacco Use     Smoking status: Never     Smokeless tobacco: Never   Vaping Use     Vaping Use: Never used   Substance Use Topics     Alcohol use: Yes     Comment: none     Drug use: Not Currently     Types: Marijuana     Comment: very seldom        History   Sexual Activity     Sexual activity: Yes     Partners: Male             4/17/2019    11:14 AM 9/27/2019     9:29 AM   PHQ   PHQ-9 Total Score 3 1   Q9: Thoughts of better off dead/self-harm past 2 weeks Not at all Not at all        Immunization History   Administered Date(s) Administered     COVID-19 12+ (2023-24) (Pfizer) 10/18/2023     COVID-19 MONOVALENT 12+ (Pfizer) 03/11/2021, 04/01/2021     Flu, Unspecified 09/16/2020     HPV9 07/24/2023, 08/24/2023     Hep B, Adult (Heplisav- B) 10/05/2023, 11/17/2023     Hepatitis A (ADULT 19+) 10/05/2023     Influenza Vaccine >6 months,quad, PF 10/18/2023     [place] Encephalitis IM 10/27/2023, 11/30/2023     Poliovirus, inactivated (IPV) 10/27/2023     TDAP (Adacel,Boostrix) 10/02/2023     Typhoid IM 11/30/2023        Health Maintenance Due   Topic Date Due     YEARLY PREVENTIVE VISIT  Never done     ADVANCE CARE PLANNING  Never done     GLUCOSE  01/22/2023     IPV IMMUNIZATION (2 of 3 - Adult catch-up series) 11/24/2023      Health Maintenance  "components reviewed - Seasonal Influenza vaccine status is up to date & Covid-19 vaccine status is up to date.    Diet: in general, a \"healthy\" diet      Exercise: 6-7 days/week for an average of greater than 60 minutes    Review of Systems         OBJECTIVE     BP (!) 137/90 (BP Location: Left arm, Patient Position: Sitting, Cuff Size: Adult Large)   Pulse 72   Temp 98.3  F (36.8  C) (Oral)   Resp 18   Ht 1.824 m (5' 11.8\")   Wt 105 kg (231 lb 6.4 oz)   SpO2 96%   BMI 31.56 kg/m         Estimated body mass index is 31.56 kg/m  as calculated from the following:    Height as of this encounter: 1.824 m (5' 11.8\").    Weight as of this encounter: 105 kg (231 lb 6.4 oz).    Complete \"Weight Managment Plan\" in the progress note from the Adult Preventative or Medicare smartsets, use phrase .WEIGHTPLAN, or choose an option from [employer] smartset below.        Labs:  Lab Results   Component Value Date    WBC 5.2 02/24/2020    HGB 15.4 02/24/2020    HCT 45.8 02/24/2020     02/24/2020    ALT 30 01/22/2020    AST 41 01/22/2020     01/22/2020    BUN 11 01/22/2020    CO2 29 01/22/2020       Physical Exam          ASSESSMENT/PLAN     (Z00.00) Routine history and physical examination of adult  (primary encounter diagnosis)    Plan: CBC with platelets differential, Comprehensive         metabolic panel, TSH with free T4 reflex, Lipid        panel reflex to direct LDL Fasting, Hemoglobin         A1c    (R06.2) Wheezing  Plan: XR CHEST 2 VW, General PFT Lab (Please always         keep checked), Pulmonary Function Test,         albuterol (PROAIR HFA/PROVENTIL HFA/VENTOLIN         HFA) 108 (90 Base) MCG/ACT inhaler, Exercise         Stress Test - Adult    (R05.1) Acute cough    Plan: XR CHEST 2 VW, General PFT Lab (Please always         keep checked), Pulmonary Function Test,         albuterol (PROAIR HFA/PROVENTIL HFA/VENTOLIN         HFA) 108 (90 Base) MCG/ACT inhaler, Exercise         Stress " Test - Adult    (R06.09) Other form of dyspnea    Plan: XR CHEST 2 VW, General PFT Lab (Please always         keep checked), Pulmonary Function Test,         albuterol (PROAIR HFA/PROVENTIL HFA/VENTOLIN         HFA) 108 (90 Base) MCG/ACT inhaler, Exercise         Stress Test - Adult     -Discussed/Reinforced healthy diety, lifestyle, exercise and safety.  -Recommended completion of routine dental and eye exam.  -Lab screenings completed today. Results pending.     Cholesterol Screening (if applicable): Complete Date of Completion: today Follow-up Recommendation: as needed  Diabetes Screening (if applicable): Complete Date of Completion: today Follow-up Recommendation: as needed  Thyroid Screening (if applicable): Complete Date of Completion: today Follow-up Recommendation: as needed  Depression Screening (if applicable): Complete Date of Completion: today Follow-up Recommendation: one year    Moy will be gone in 5 days for 5 weeks.  We will try to get as many of his tests done as possible before he goes.  He will be in touch when he comes back.  He will seek care where he is at as needed.  Follow-Up:  Follow up in one year, or sooner if needed.     Patient engaged in their plan of care. Patient verbalized understanding and agreed with the final plan.  AVS printed and given to patient.    Indu Brady NP   [facility]  [facility] [address] [address] [phone]

## 2024-01-31 NOTE — NURSING NOTE
"ROOM:1  EWA JOSHI    Preferred Name: Moy     How did you hear about us?  Current Patient    38 year old  Chief Complaint   Patient presents with     Physical     Patient needs a wellness check/physical. Wants to do blood work. The patient mentions breathing issues that started on Jan 13th. The patient mentions that they had a difficult time breathing on the 14th and 15th of this month. Says that it felt like his lungs were swelling. The patient took at-home covid tests and they all came back negative. The last time the patient took a covid test was this morning.        Blood pressure (!) 137/90, pulse 72, temperature 98.3  F (36.8  C), temperature source Oral, resp. rate 18, height 1.824 m (5' 11.8\"), weight 105 kg (231 lb 6.4 oz), SpO2 96%. Body mass index is 31.56 kg/m .  BP completed using cuff size:        Patient Active Problem List   Diagnosis     Dermatitis, seborrheic     Right wrist pain     Psoriasis     Photoaging of skin       Wt Readings from Last 2 Encounters:   01/31/24 105 kg (231 lb 6.4 oz)   08/21/23 115.8 kg (255 lb 6.4 oz)     BP Readings from Last 3 Encounters:   01/31/24 (!) 137/90   08/21/23 128/85   11/03/20 (!) 162/95       Allergies   Allergen Reactions     Intal [Cromolyn]        Current Outpatient Medications   Medication     tacrolimus (PROTOPIC) 0.1 % external ointment     tretinoin (RETIN-A) 0.025 % external cream     Current Facility-Administered Medications   Medication     betamethasone acet & sod phos (CELESTONE) injection 6 mg     lidocaine (PF) (XYLOCAINE) 1 % injection 1 mL       Social History     Tobacco Use     Smoking status: Never     Smokeless tobacco: Never   Vaping Use     Vaping Use: Never used   Substance Use Topics     Alcohol use: Yes     Comment: maybe twice alcohol     Drug use: Not Currently     Types: Marijuana     Comment: very seldom       Honoring Choices - Health Care Directive Guide offered to patient at time of visit.    Health Maintenance Due " "  Topic Date Due     YEARLY PREVENTIVE VISIT  Never done     ADVANCE CARE PLANNING  Never done     GLUCOSE  01/22/2023     IPV IMMUNIZATION (2 of 3 - Adult catch-up series) 11/24/2023       Immunization History   Administered Date(s) Administered     COVID-19 12+ (2023-24) (Pfizer) 10/18/2023     COVID-19 MONOVALENT 12+ (Pfizer) 03/11/2021, 04/01/2021     Flu, Unspecified 09/16/2020     HPV9 07/24/2023, 08/24/2023     Hep B, Adult (Heplisav- B) 10/05/2023, 11/17/2023     Hepatitis A (ADULT 19+) 10/05/2023     Influenza Vaccine >6 months,quad, PF 10/18/2023     [place] Encephalitis IM 10/27/2023, 11/30/2023     Poliovirus, inactivated (IPV) 10/27/2023     TDAP (Adacel,Boostrix) 10/02/2023     Typhoid IM 11/30/2023       No results found for: \"PAP\"    Recent Labs   Lab Test 01/22/20  1308   ALT 30   CR 1.01   GFRESTIMATED >90   GFRESTBLACK >90   ALBUMIN 4.3   POTASSIUM 4.3           1/31/2024     8:48 AM 8/21/2023     8:15 AM   PHQ-2 ( 1999 Pfizer)   Q1: Little interest or pleasure in doing things 0 0   Q2: Feeling down, depressed or hopeless 0 0   PHQ-2 Score 0 0   Q1: Little interest or pleasure in doing things Not at all    Q2: Feeling down, depressed or hopeless Not at all    PHQ-2 Score 0            4/17/2019    11:14 AM 9/27/2019     9:29 AM   PHQ-9 SCORE   PHQ-9 Total Score 3 1           4/17/2019    11:14 AM 9/27/2019     9:29 AM 1/31/2024     8:45 AM   IAN-7 SCORE   Total Score   0 (minimal anxiety)   Total Score 0 0 0            No data to display                Rebeka Hatch    January 31, 2024 11:00 AM    "

## 2024-02-01 LAB
ALBUMIN SERPL BCG-MCNC: 4.6 G/DL (ref 3.5–5.2)
ALP SERPL-CCNC: 62 U/L (ref 40–150)
ALT SERPL W P-5'-P-CCNC: 21 U/L (ref 0–70)
ANION GAP SERPL CALCULATED.3IONS-SCNC: 11 MMOL/L (ref 7–15)
AST SERPL W P-5'-P-CCNC: 35 U/L (ref 0–45)
BILIRUB SERPL-MCNC: 0.5 MG/DL
BUN SERPL-MCNC: 14.8 MG/DL (ref 6–20)
CALCIUM SERPL-MCNC: 9.5 MG/DL (ref 8.6–10)
CHLORIDE SERPL-SCNC: 101 MMOL/L (ref 98–107)
CHOLEST SERPL-MCNC: 251 MG/DL
CREAT SERPL-MCNC: 0.91 MG/DL (ref 0.67–1.17)
DEPRECATED HCO3 PLAS-SCNC: 25 MMOL/L (ref 22–29)
EGFRCR SERPLBLD CKD-EPI 2021: >90 ML/MIN/1.73M2
FASTING STATUS PATIENT QL REPORTED: ABNORMAL
GLUCOSE SERPL-MCNC: 92 MG/DL (ref 70–99)
HDLC SERPL-MCNC: 41 MG/DL
LDLC SERPL CALC-MCNC: 180 MG/DL
NONHDLC SERPL-MCNC: 210 MG/DL
POTASSIUM SERPL-SCNC: 4.5 MMOL/L (ref 3.4–5.3)
PROT SERPL-MCNC: 7.7 G/DL (ref 6.4–8.3)
SODIUM SERPL-SCNC: 137 MMOL/L (ref 135–145)
TRIGL SERPL-MCNC: 150 MG/DL
TSH SERPL DL<=0.005 MIU/L-ACNC: 1.82 UIU/ML (ref 0.3–4.2)

## 2024-02-05 ENCOUNTER — OFFICE VISIT (OUTPATIENT)
Dept: PULMONOLOGY | Facility: CLINIC | Age: 39
End: 2024-02-05
Payer: COMMERCIAL

## 2024-02-05 DIAGNOSIS — R05.1 ACUTE COUGH: ICD-10-CM

## 2024-02-05 DIAGNOSIS — R06.09 OTHER FORM OF DYSPNEA: ICD-10-CM

## 2024-02-05 DIAGNOSIS — R06.2 WHEEZING: ICD-10-CM

## 2024-02-05 PROCEDURE — 94726 PLETHYSMOGRAPHY LUNG VOLUMES: CPT | Performed by: INTERNAL MEDICINE

## 2024-02-05 PROCEDURE — 94060 EVALUATION OF WHEEZING: CPT | Performed by: INTERNAL MEDICINE

## 2024-02-05 PROCEDURE — 94729 DIFFUSING CAPACITY: CPT | Performed by: INTERNAL MEDICINE

## 2024-02-07 LAB
DLCOCOR-%PRED-PRE: 115 %
DLCOCOR-PRE: 38.47 ML/MIN/MMHG
DLCOUNC-%PRED-PRE: 115 %
DLCOUNC-PRE: 38.36 ML/MIN/MMHG
DLCOUNC-PRED: 33.32 ML/MIN/MMHG
ERV-%PRED-PRE: 138 %
ERV-PRE: 2.59 L
ERV-PRED: 1.87 L
EXPTIME-PRE: 8.98 SEC
FEF2575-%PRED-POST: 112 %
FEF2575-%PRED-PRE: 72 %
FEF2575-POST: 4.69 L/SEC
FEF2575-PRE: 3.01 L/SEC
FEF2575-PRED: 4.16 L/SEC
FEFMAX-%PRED-PRE: 101 %
FEFMAX-PRE: 10.8 L/SEC
FEFMAX-PRED: 10.66 L/SEC
FEV1-%PRED-PRE: 108 %
FEV1-PRE: 4.59 L
FEV1FEV6-PRE: 71 %
FEV1FEV6-PRED: 82 %
FEV1FVC-PRE: 69 %
FEV1FVC-PRED: 82 %
FEV1SVC-PRE: 73 %
FEV1SVC-PRED: 80 %
FIFMAX-PRE: 7.55 L/SEC
FRCPLETH-%PRED-PRE: 99 %
FRCPLETH-PRE: 3.5 L
FRCPLETH-PRED: 3.53 L
FVC-%PRED-PRE: 126 %
FVC-PRE: 6.6 L
FVC-PRED: 5.21 L
IC-%PRED-PRE: 98 %
IC-PRE: 3.69 L
IC-PRED: 3.74 L
RVPLETH-%PRED-PRE: 45 %
RVPLETH-PRE: 0.91 L
RVPLETH-PRED: 2.01 L
TLCPLETH-%PRED-PRE: 95 %
TLCPLETH-PRE: 7.19 L
TLCPLETH-PRED: 7.53 L
VA-%PRED-PRE: 105 %
VA-PRE: 7.56 L
VC-%PRED-PRE: 118 %
VC-PRE: 6.28 L
VC-PRED: 5.3 L

## 2024-06-20 ENCOUNTER — TELEPHONE (OUTPATIENT)
Dept: FAMILY MEDICINE | Facility: CLINIC | Age: 39
End: 2024-06-20

## 2024-06-20 NOTE — CONFIDENTIAL NOTE
Patient called stating they do an online program where they get at home testing for Gonorrhea and they tested positive for oral Gonorrhea and was requesting treatment to be sent to [place]. Writer explained that providers would not be able to send a prescription out of state due to licensing. Said Indu in the past has sent something to [place] it was Doxycycline. Writer spoke to the provider in clinic today and they are not able to and not willing to send a prescription for treatment. Anyway the patient would need a shot of treatment not oral medication. Writer called back patient to explain they would need to see someone out there to get treatment, pt said their insurance won't cover out of state. Writer suggested [facility] minute clinic or an urgent care. Pt understood and will try another route.    Jena BIAN CMA 9:46 AM 6/20/2024

## 2024-08-07 ENCOUNTER — OFFICE VISIT (OUTPATIENT)
Dept: FAMILY MEDICINE | Facility: CLINIC | Age: 39
End: 2024-08-07
Payer: COMMERCIAL

## 2024-08-07 VITALS
TEMPERATURE: 98.1 F | DIASTOLIC BLOOD PRESSURE: 82 MMHG | HEART RATE: 57 BPM | WEIGHT: 223.6 LBS | RESPIRATION RATE: 18 BRPM | BODY MASS INDEX: 30.28 KG/M2 | HEIGHT: 72 IN | SYSTOLIC BLOOD PRESSURE: 128 MMHG | OXYGEN SATURATION: 100 %

## 2024-08-07 DIAGNOSIS — E78.5 HYPERLIPIDEMIA LDL GOAL <100: Primary | ICD-10-CM

## 2024-08-07 DIAGNOSIS — R73.03 PREDIABETES: ICD-10-CM

## 2024-08-07 LAB
CHOLEST SERPL-MCNC: 172 MG/DL
FASTING STATUS PATIENT QL REPORTED: ABNORMAL
HBA1C MFR BLD: 5.8 %
HDLC SERPL-MCNC: 40 MG/DL
LDLC SERPL CALC-MCNC: 116 MG/DL
NONHDLC SERPL-MCNC: 132 MG/DL
TRIGL SERPL-MCNC: 78 MG/DL

## 2024-08-07 PROCEDURE — 80061 LIPID PANEL: CPT | Mod: ORL | Performed by: NURSE PRACTITIONER

## 2024-08-07 PROCEDURE — 83036 HEMOGLOBIN GLYCOSYLATED A1C: CPT | Mod: ORL | Performed by: NURSE PRACTITIONER

## 2024-08-07 RX ORDER — EMTRICITABINE AND TENOFOVIR DISOPROXIL FUMARATE 200; 300 MG/1; MG/1
1 TABLET, FILM COATED ORAL DAILY
COMMUNITY
End: 2024-08-07

## 2024-08-07 ASSESSMENT — PAIN SCALES - GENERAL: PAINLEVEL: NO PAIN (0)

## 2024-08-07 NOTE — NURSING NOTE
"ROOM:4  EWA JOSHI    Preferred Name: Moy     How did you hear about us?  Current Patient     Services Provided? No    38 year old  Chief Complaint   Patient presents with    Follow Up     Redo A1c and lipid panel        Blood pressure (!) 143/82, pulse 57, temperature 98.1  F (36.7  C), temperature source Oral, resp. rate 18, height 1.826 m (5' 11.9\"), weight 101.4 kg (223 lb 9.6 oz), SpO2 100%. Body mass index is 30.41 kg/m .  BP completed using cuff size:        Patient Active Problem List   Diagnosis    Dermatitis, seborrheic    Right wrist pain    Psoriasis    Photoaging of skin       Wt Readings from Last 2 Encounters:   08/07/24 101.4 kg (223 lb 9.6 oz)   01/31/24 105 kg (231 lb 6.4 oz)     BP Readings from Last 3 Encounters:   08/07/24 (!) 143/82   01/31/24 (!) 137/90   08/21/23 128/85       Allergies   Allergen Reactions    Intal [Cromolyn]        Current Outpatient Medications   Medication Sig Dispense Refill    albuterol (PROAIR HFA/PROVENTIL HFA/VENTOLIN HFA) 108 (90 Base) MCG/ACT inhaler Inhale 2 puffs into the lungs every 6 hours as needed for shortness of breath, wheezing or cough 18 g 3    tacrolimus (PROTOPIC) 0.1 % external ointment APPLY TOPICALLY TWICE DAILY. 60 g 2    tretinoin (RETIN-A) 0.025 % external cream Apply topically at bedtime ALWAYS APPLY SUNSCREEN NEXT DAY (REAPPLY EVERY 2 HOURS) 45 g 3    emtricitabine-tenofovir (TRUVADA) 200-300 MG per tablet Take 1 tablet by mouth daily (Patient not taking: Reported on 8/7/2024)       Current Facility-Administered Medications   Medication Dose Route Frequency Provider Last Rate Last Admin    betamethasone acet & sod phos (CELESTONE) injection 6 mg  6 mg   Shankar Meek MD   6 mg at 10/23/23 0800    lidocaine (PF) (XYLOCAINE) 1 % injection 1 mL  1 mL   Shankar Meek MD   1 mL at 10/23/23 0800       Social History     Tobacco Use    Smoking status: Never    Smokeless tobacco: Never   Vaping Use    Vaping " "status: Never Used   Substance Use Topics    Alcohol use: Yes     Comment: none    Drug use: Not Currently     Types: Marijuana     Comment: very seldom       Honoring Choices - Health Care Directive Guide offered to patient at time of visit.    Health Maintenance Due   Topic Date Due    ADVANCE CARE PLANNING  Never done    IPV IMMUNIZATION (2 of 3 - Adult catch-up series) 11/24/2023       Immunization History   Administered Date(s) Administered    COVID-19 12+ (2023-24) (Pfizer) 10/18/2023    COVID-19 MONOVALENT 12+ (Pfizer) 03/11/2021, 04/01/2021    Flu, Unspecified 09/16/2020    HPV9 07/24/2023, 08/24/2023    Hep B, Adult (Heplisav- B) 10/05/2023, 11/17/2023    Hepatitis A (ADULT 19+) 10/05/2023    Influenza Vaccine >6 months,quad, PF 10/18/2023    [place] Encephalitis IM 10/27/2023, 11/30/2023    Poliovirus, inactivated (IPV) 10/27/2023    TDAP (Adacel,Boostrix) 10/02/2023    Typhoid IM 11/30/2023       No results found for: \"PAP\"    Recent Labs   Lab Test 01/31/24  1211 01/22/20  1308   A1C 5.8*  --    *  --    HDL 41  --    TRIG 150*  --    ALT 21 30   CR 0.91 1.01   GFRESTIMATED >90 >90   GFRESTBLACK  --  >90   ALBUMIN 4.6 4.3   POTASSIUM 4.5 4.3   TSH 1.82  --            8/7/2024     8:07 AM 1/31/2024     8:48 AM   PHQ-2 ( 1999 Pfizer)   Q1: Little interest or pleasure in doing things 0 0   Q2: Feeling down, depressed or hopeless 0 0   PHQ-2 Score 0 0   Q1: Little interest or pleasure in doing things Not at all Not at all   Q2: Feeling down, depressed or hopeless Not at all Not at all   PHQ-2 Score 0 0           4/17/2019    11:14 AM 9/27/2019     9:29 AM   PHQ-9 SCORE   PHQ-9 Total Score 3 1           4/17/2019    11:14 AM 9/27/2019     9:29 AM 1/31/2024     8:45 AM   IAN-7 SCORE   Total Score   0 (minimal anxiety)   Total Score 0 0 0            No data to display                Angelique Ruiz, EMT    August 7, 2024 8:22 AM    "

## 2024-08-07 NOTE — PROGRESS NOTES
Moy Capone is a 38 year old male who presents today to discuss:    1.)  hyperlipidemia.  Moy has been managing his lifestyle to deal with any issues that arise relate to diet and exercise.  He has been eating well and exercising most days.  He has lost 60 pounds in one year.  Moy has also changed his work mentality, he is doing more actual self care and doing what is healthy in that way for him.  He feels very good about these changes.  2.)  Prediabetes.  As above    Moy has stopped PrEP for now, he will let me know if he has needs in that area.  We had a brief discussion about doxyPEP.      Review Of Systems   ROS: 10 point ROS neg other than the symptoms noted above in the HPI.      Past Medical History:   Diagnosis Date    Eczema      Past Surgical History:   Procedure Laterality Date    wisdom teeth       Social History     Socioeconomic History    Marital status: Single     Spouse name: Not on file    Number of children: Not on file    Years of education: Not on file    Highest education level: Not on file   Occupational History    Not on file   Tobacco Use    Smoking status: Never    Smokeless tobacco: Never   Vaping Use    Vaping status: Never Used   Substance and Sexual Activity    Alcohol use: Yes     Comment: none    Drug use: Not Currently     Types: Marijuana     Comment: very seldom    Sexual activity: Yes     Partners: Male   Other Topics Concern    Parent/sibling w/ CABG, MI or angioplasty before 65F 55M? Not Asked   Social History Narrative    Not on file     Social Determinants of Health     Financial Resource Strain: Low Risk  (8/7/2024)    Financial Resource Strain     Within the past 12 months, have you or your family members you live with been unable to get utilities (heat, electricity) when it was really needed?: No   Food Insecurity: Low Risk  (8/7/2024)    Food Insecurity     Within the past 12 months, did you worry that your food would run out before you got money to buy more?: No      Within the past 12 months, did the food you bought just not last and you didn t have money to get more?: No   Transportation Needs: Low Risk  (8/7/2024)    Transportation Needs     Within the past 12 months, has lack of transportation kept you from medical appointments, getting your medicines, non-medical meetings or appointments, work, or from getting things that you need?: No   Physical Activity: Sufficiently Active (1/31/2024)    Exercise Vital Sign     Days of Exercise per Week: 6 days     Minutes of Exercise per Session: 90 min   Stress: No Stress Concern Present (1/31/2024)    [employer] [place] [employer] - Occupational Stress Questionnaire     Feeling of Stress : Not at all   Social Connections: Unknown (1/31/2024)    Social Connection and Isolation Panel [NHANES]     Frequency of Communication with Friends and Family: Not on file     Frequency of Social Gatherings with Friends and Family: More than three times a week     Attends Mosque Services: Not on file     Active Member of Clubs or Organizations: Not on file     Attends Club or Organization Meetings: Not on file     Marital Status: Not on file   Interpersonal Safety: Low Risk  (8/7/2024)    Interpersonal Safety     Do you feel physically and emotionally safe where you currently live?: Yes     Within the past 12 months, have you been hit, slapped, kicked or otherwise physically hurt by someone?: No     Within the past 12 months, have you been humiliated or emotionally abused in other ways by your partner or ex-partner?: No   Housing Stability: Low Risk  (8/7/2024)    Housing Stability     Do you have housing? : Yes     Are you worried about losing your housing?: No     Family History   Problem Relation Age of Onset    Hypertension Father     Obesity Father     Alzheimer Disease Maternal Grandfather     Skin Cancer Maternal Grandfather     Melanoma No family hx of        /82 (BP Location: Left arm, Patient Position: Sitting, Cuff  "Size: Adult Regular)   Pulse 57   Temp 98.1  F (36.7  C) (Oral)   Resp 18   Ht 1.826 m (5' 11.9\")   Wt 101.4 kg (223 lb 9.6 oz)   SpO2 100%   BMI 30.41 kg/m      Exam:  Constitutional: healthy, alert, and no distress  Cardiovascular: negative, PMI normal. No lifts, heaves, or thrills. RRR. No murmurs, clicks gallops or rub  Respiratory: negative, Percussion normal. Good diaphragmatic excursion. Lungs clear  Psychiatric: mentation appears normal and affect normal/bright    Assessment/Plan:  1. Hyperlipidemia LDL goal <100    - Lipid panel reflex to direct LDL Fasting; Future  - Hemoglobin A1c; Future    2. Prediabetes    - Lipid panel reflex to direct LDL Fasting; Future  - Hemoglobin A1c; Future    We will follow up when labs are back.     Options for treatment and follow-up care were reviewed with the patient. Patient engaged in the decision making process and verbalized understanding of the options discussed and agreed with the final plan.    "

## 2024-08-27 ENCOUNTER — LAB (OUTPATIENT)
Dept: LAB | Facility: CLINIC | Age: 39
End: 2024-08-27

## 2024-08-27 DIAGNOSIS — Z11.4 SCREENING FOR HUMAN IMMUNODEFICIENCY VIRUS: ICD-10-CM

## 2024-08-27 DIAGNOSIS — Z13.83 SCREENING FOR RESPIRATORY CONDITION: ICD-10-CM

## 2024-08-27 DIAGNOSIS — Z31.440 ENCOUNTER OF MALE FOR TESTING FOR GENETIC DISEASE CARRIER STATUS FOR PROCREATIVE MANAGEMENT: ICD-10-CM

## 2024-08-27 DIAGNOSIS — Z11.59 SCREENING FOR VIRAL DISEASE: Primary | ICD-10-CM

## 2024-08-27 DIAGNOSIS — Z11.3 SCREEN FOR SEXUALLY TRANSMITTED DISEASES: ICD-10-CM

## 2024-08-27 LAB
ABO/RH(D): NORMAL
CMV IGG SERPL IA-ACNC: 3.3 U/ML
CMV IGG SERPL IA-ACNC: ABNORMAL
HBV SURFACE AG SERPL QL IA: NONREACTIVE
HCV AB SERPL QL IA: NONREACTIVE
HIV 1+2 AB+HIV1 P24 AG SERPL QL IA: NONREACTIVE
SPECIMEN EXPIRATION DATE: NORMAL
T PALLIDUM AB SER QL: NONREACTIVE

## 2024-08-27 PROCEDURE — 86790 VIRUS ANTIBODY NOS: CPT | Mod: 90 | Performed by: PATHOLOGY

## 2024-08-27 PROCEDURE — 87340 HEPATITIS B SURFACE AG IA: CPT | Performed by: OBSTETRICS & GYNECOLOGY

## 2024-08-27 PROCEDURE — 86803 HEPATITIS C AB TEST: CPT | Performed by: OBSTETRICS & GYNECOLOGY

## 2024-08-27 PROCEDURE — 87389 HIV-1 AG W/HIV-1&-2 AB AG IA: CPT | Performed by: OBSTETRICS & GYNECOLOGY

## 2024-08-27 PROCEDURE — 86645 CMV ANTIBODY IGM: CPT | Performed by: OBSTETRICS & GYNECOLOGY

## 2024-08-27 PROCEDURE — 87491 CHLMYD TRACH DNA AMP PROBE: CPT | Performed by: OBSTETRICS & GYNECOLOGY

## 2024-08-27 PROCEDURE — 86644 CMV ANTIBODY: CPT | Performed by: OBSTETRICS & GYNECOLOGY

## 2024-08-27 PROCEDURE — 99000 SPECIMEN HANDLING OFFICE-LAB: CPT | Performed by: PATHOLOGY

## 2024-08-27 PROCEDURE — 36415 COLL VENOUS BLD VENIPUNCTURE: CPT | Performed by: PATHOLOGY

## 2024-08-27 PROCEDURE — 86780 TREPONEMA PALLIDUM: CPT | Performed by: OBSTETRICS & GYNECOLOGY

## 2024-08-27 PROCEDURE — 86900 BLOOD TYPING SEROLOGIC ABO: CPT | Performed by: OBSTETRICS & GYNECOLOGY

## 2024-08-28 ENCOUNTER — VIRTUAL VISIT (OUTPATIENT)
Dept: FAMILY MEDICINE | Facility: CLINIC | Age: 39
End: 2024-08-28
Payer: COMMERCIAL

## 2024-08-28 DIAGNOSIS — Z31.9 INFERTILITY MANAGEMENT: Primary | ICD-10-CM

## 2024-08-28 DIAGNOSIS — B00.9 HSV-1 INFECTION: Primary | ICD-10-CM

## 2024-08-28 LAB
C TRACH DNA SPEC QL PROBE+SIG AMP: NEGATIVE
CMV IGM SERPL IA-ACNC: <8 AU/ML
CMV IGM SERPL IA-ACNC: NEGATIVE
Lab: NORMAL
N GONORRHOEA DNA SPEC QL NAA+PROBE: NEGATIVE
PERFORMING LABORATORY: NORMAL
SPECIMEN STATUS: NORMAL
TEST NAME: NORMAL

## 2024-08-28 RX ORDER — VALACYCLOVIR HYDROCHLORIDE 1 G/1
2000 TABLET, FILM COATED ORAL DAILY
Qty: 4 TABLET | Refills: 4 | Status: SHIPPED | OUTPATIENT
Start: 2024-08-28

## 2024-08-28 NOTE — PROGRESS NOTES
Moy is a 38 year old who is being evaluated via a billable telephone visit.    What phone number would you like to be contacted at? [phone]  How would you like to obtain your AVS? MyChart  Originating Location (pt. Location): Home    Distant Location (provider location):  On-site    Subjective   Moy is a 38 year old, presenting for the following health issues:    Moy is [facility].  He is going to donate sperm to a known person(s) in [place], this is a work up to evaluate the health of his sperm and other factors.      Review of Systems  CONSTITUTIONAL: NEGATIVE for fever, chills, change in weight  ENT/MOUTH: NEGATIVE for ear, mouth and throat problems  RESP: NEGATIVE for significant cough or SOB  CV: NEGATIVE for chest pain, palpitations or peripheral edema      Objective         Vitals:  No vitals were obtained today due to virtual visit.    Physical Exam   General: Alert and no distress //Respiratory: No audible wheeze, cough, or shortness of breath // Psychiatric:  Appropriate affect, tone, and pace of words    (Z31.9) Infertility management  (primary encounter diagnosis)  Comment: Moy will call to make the appointment for evaluation.  He will let me know if there is anything else we need to do to facilitate this process.  Plan: Ob/Gyn  Referral          Phone call duration: 8 minutes  Signed Electronically by: Indu Brady NP

## 2024-08-29 LAB — HTLV I+II AB SER QL IA: NEGATIVE

## 2024-08-30 ENCOUNTER — TELEPHONE (OUTPATIENT)
Dept: FAMILY MEDICINE | Facility: CLINIC | Age: 39
End: 2024-08-30

## 2024-08-30 NOTE — TELEPHONE ENCOUNTER
Rylee from [place] [facility] called and they need the name of the specific lab test that Indu wants done. Call back [phone]. Can leave VM.

## 2024-08-30 NOTE — TELEPHONE ENCOUNTER
Patient called and stated that the fertility clinic said they have not received the referral via fax but they gave him a email.     [email or web address]     Angelique ANDERSON, EMT 1:43 PM 8/30/2024

## 2024-09-25 ENCOUNTER — OFFICE VISIT (OUTPATIENT)
Dept: FAMILY MEDICINE | Facility: CLINIC | Age: 39
End: 2024-09-25
Payer: COMMERCIAL

## 2024-09-25 VITALS
SYSTOLIC BLOOD PRESSURE: 154 MMHG | WEIGHT: 223 LBS | OXYGEN SATURATION: 97 % | HEART RATE: 68 BPM | TEMPERATURE: 98.6 F | DIASTOLIC BLOOD PRESSURE: 81 MMHG | BODY MASS INDEX: 30.2 KG/M2 | HEIGHT: 72 IN

## 2024-09-25 DIAGNOSIS — R89.8 ABNORMAL GENETIC TEST: Primary | ICD-10-CM

## 2024-09-25 NOTE — PROGRESS NOTES
"Moy Capone is a 38 year old male who presents today for a potential referral to a nephrologist.  Moy is in the process of donating sperm to inseminate a known person.  In the process genetic testing was done and out of 341 tests done he had one recessive gene mutation related to [place]'s Disease of the kidney.  It was suggested to him that he see a nephrologist and also to let his family know.  Moy does not have any symptoms and his serum kidney function testing has been normal.      Moy also is doing work on caring for himself both physically and emotionally.  He is using creatine and potentially a product Nicotinamide mononucleotide (NMN) which  is one of the main precursors of nicotinamide adenine dinucleotide (NAD+) -- \"an essential enzyme for various critical cell functions, including metabolism, DNA repair, cell growth and survival.\" - [employer].  Moy would like to further discuss this and is interested in using this as a supplement.    Review Of Systems   ROS: 10 point ROS neg other than the symptoms noted above in the HPI.      Past Medical History:   Diagnosis Date    Eczema      Past Surgical History:   Procedure Laterality Date    wisdom teeth       Social History     Socioeconomic History    Marital status: Single     Spouse name: Not on file    Number of children: Not on file    Years of education: Not on file    Highest education level: Not on file   Occupational History    Not on file   Tobacco Use    Smoking status: Never    Smokeless tobacco: Never   Vaping Use    Vaping status: Never Used   Substance and Sexual Activity    Alcohol use: Yes     Comment: none    Drug use: Not Currently     Types: Marijuana     Comment: very seldom    Sexual activity: Yes     Partners: Male   Other Topics Concern    Parent/sibling w/ CABG, MI or angioplasty before 65F 55M? Not Asked   Social History Narrative    Not on file     Social Determinants of Health     Financial Resource Strain: Low Risk  " (8/7/2024)    Financial Resource Strain     Within the past 12 months, have you or your family members you live with been unable to get utilities (heat, electricity) when it was really needed?: No   Food Insecurity: Low Risk  (8/7/2024)    Food Insecurity     Within the past 12 months, did you worry that your food would run out before you got money to buy more?: No     Within the past 12 months, did the food you bought just not last and you didn t have money to get more?: No   Transportation Needs: Low Risk  (8/7/2024)    Transportation Needs     Within the past 12 months, has lack of transportation kept you from medical appointments, getting your medicines, non-medical meetings or appointments, work, or from getting things that you need?: No   Physical Activity: Sufficiently Active (1/31/2024)    Exercise Vital Sign     Days of Exercise per Week: 6 days     Minutes of Exercise per Session: 90 min   Stress: No Stress Concern Present (9/25/2024)    [employer] [place] [employer]ational [employer] - Occupational Stress Questionnaire     Feeling of Stress : Not at all   Social Connections: Unknown (1/31/2024)    Social Connection and Isolation Panel [NHANES]     Frequency of Communication with Friends and Family: Not on file     Frequency of Social Gatherings with Friends and Family: More than three times a week     Attends [employer] Services: Not on file     Active Member of Clubs or Organizations: Not on file     Attends Club or Organization Meetings: Not on file     Marital Status: Not on file   Interpersonal Safety: Low Risk  (8/28/2024)    Interpersonal Safety     Do you feel physically and emotionally safe where you currently live?: Yes     Within the past 12 months, have you been hit, slapped, kicked or otherwise physically hurt by someone?: No     Within the past 12 months, have you been humiliated or emotionally abused in other ways by your partner or ex-partner?: No   Housing Stability: Low Risk  (8/7/2024)     "Housing Stability     Do you have housing? : Yes     Are you worried about losing your housing?: No     Family History   Problem Relation Age of Onset    Hypertension Father     Obesity Father     Alzheimer Disease Maternal Grandfather     Skin Cancer Maternal Grandfather     Melanoma No family hx of        BP (!) 154/81 (BP Location: Left arm, Patient Position: Sitting, Cuff Size: Adult Regular)   Pulse 68   Temp 98.6  F (37  C) (Oral)   Ht 1.826 m (5' 11.9\")   Wt 101.2 kg (223 lb)   SpO2 97%   BMI 30.33 kg/m      Exam:  Constitutional: healthy, alert, and no distress  Psychiatric: mentation appears normal and affect normal/bright      Assessment/Plan:  1. Abnormal genetic test    - Adult Nephrology  Referral; Future    I will spend some time to study NMN and NAD+ to be better versed in it's use.  At first glance it seems safe at 900 mg/day, but the FDA states it cannot be marketed as supplement, they \"banned\" it     I will follow up with Moy in the near future.    Options for treatment and follow-up care were reviewed with the patient. Patient engaged in the decision making process and verbalized understanding of the options discussed and agreed with the final plan.    "

## 2024-09-25 NOTE — NURSING NOTE
"ROOM:2  EWA JOSHI    Preferred Name: Moy     Social Determinants of Health   SDoH screening reviewed today: Yes     Services Provided? No    38 year old  Chief Complaint   Patient presents with    Referral     nephrologist       Blood pressure (!) 154/81, pulse 68, temperature 98.6  F (37  C), temperature source Oral, height 1.826 m (5' 11.9\"), weight 101.2 kg (223 lb), SpO2 97%. Body mass index is 30.33 kg/m .  BP completed using cuff size:        Patient Active Problem List   Diagnosis    Dermatitis, seborrheic    Right wrist pain    Psoriasis    Photoaging of skin       Wt Readings from Last 2 Encounters:   09/25/24 101.2 kg (223 lb)   08/07/24 101.4 kg (223 lb 9.6 oz)     BP Readings from Last 3 Encounters:   09/25/24 (!) 154/81   08/07/24 128/82   01/31/24 (!) 137/90       Allergies   Allergen Reactions    Ethanol Nausea    Intal [Cromolyn]        Current Outpatient Medications   Medication Sig Dispense Refill    albuterol (PROAIR HFA/PROVENTIL HFA/VENTOLIN HFA) 108 (90 Base) MCG/ACT inhaler Inhale 2 puffs into the lungs every 6 hours as needed for shortness of breath, wheezing or cough 18 g 3    CREATINE PO Take by mouth.      tacrolimus (PROTOPIC) 0.1 % external ointment APPLY TOPICALLY TWICE DAILY. 60 g 2    tretinoin (RETIN-A) 0.025 % external cream Apply topically at bedtime ALWAYS APPLY SUNSCREEN NEXT DAY (REAPPLY EVERY 2 HOURS) 45 g 3    valACYclovir (VALTREX) 1000 mg tablet Take 2 tablets (2,000 mg) by mouth daily. 4 tablet 4     Current Facility-Administered Medications   Medication Dose Route Frequency Provider Last Rate Last Admin    betamethasone acet & sod phos (CELESTONE) injection 6 mg  6 mg   Shankar Meek MD   6 mg at 10/23/23 0800    lidocaine (PF) (XYLOCAINE) 1 % injection 1 mL  1 mL   Shankar Meek MD   1 mL at 10/23/23 0800       Social History     Tobacco Use    Smoking status: Never    Smokeless tobacco: Never   Vaping Use    Vaping status: Never " "Used   Substance Use Topics    Alcohol use: Yes     Comment: none    Drug use: Not Currently     Types: Marijuana     Comment: very seldom       Honoring Choices - Health Care Directive Guide offered to patient at time of visit.    Health Maintenance Due   Topic Date Due    ADVANCE CARE PLANNING  Never done    INFLUENZA VACCINE (1) 09/01/2024    COVID-19 Vaccine (4 - 2024-25 season) 09/01/2024       Immunization History   Administered Date(s) Administered    COVID-19 12+ (Pfizer) 10/18/2023    COVID-19 MONOVALENT 12+ (Pfizer) 03/11/2021, 04/01/2021    Flu, Unspecified 09/16/2020    HPV9 07/24/2023, 08/24/2023    Hep B, Adult (Heplisav- B) 10/05/2023, 11/17/2023    Hepatitis A (ADULT 19+) 10/05/2023    Influenza Vaccine >6 months,quad, PF 10/18/2023    [place] Encephalitis IM 10/27/2023, 11/30/2023    Poliovirus, inactivated (IPV) 10/27/2023    TDAP (Adacel,Boostrix) 10/02/2023    Typhoid IM 11/30/2023       No results found for: \"PAP\"    Recent Labs   Lab Test 08/07/24  0921 01/31/24  1211 01/22/20  1308   A1C 5.8* 5.8*  --    * 180*  --    HDL 40 41  --    TRIG 78 150*  --    ALT  --  21 30   CR  --  0.91 1.01   GFRESTIMATED  --  >90 >90   GFRESTBLACK  --   --  >90   ALBUMIN  --  4.6 4.3   POTASSIUM  --  4.5 4.3   TSH  --  1.82  --            9/25/2024     9:34 AM 8/28/2024    10:22 AM   PHQ-2 ( 1999 Pfizer)   Q1: Little interest or pleasure in doing things 0 0   Q2: Feeling down, depressed or hopeless 0 0   PHQ-2 Score 0 0           4/17/2019    11:14 AM 9/27/2019     9:29 AM   PHQ-9 SCORE   PHQ-9 Total Score 3 1           4/17/2019    11:14 AM 9/27/2019     9:29 AM 1/31/2024     8:45 AM   IAN-7 SCORE   Total Score   0 (minimal anxiety)   Total Score 0 0 0            No data to display                Jena Novak    September 25, 2024 9:42 AM    "

## 2024-09-27 ENCOUNTER — TRANSFERRED RECORDS (OUTPATIENT)
Dept: HEALTH INFORMATION MANAGEMENT | Facility: CLINIC | Age: 39
End: 2024-09-27

## 2024-11-12 LAB
SCANNED LAB RESULT: NORMAL
TEST NAME: NORMAL

## 2024-12-19 ENCOUNTER — MYC REFILL (OUTPATIENT)
Dept: FAMILY MEDICINE | Facility: CLINIC | Age: 39
End: 2024-12-19

## 2024-12-19 DIAGNOSIS — L65.9 HAIR LOSS: ICD-10-CM

## 2024-12-29 RX ORDER — TACROLIMUS 1 MG/G
OINTMENT TOPICAL 2 TIMES DAILY
Qty: 60 G | Refills: 2 | Status: SHIPPED | OUTPATIENT
Start: 2024-12-29

## 2025-03-15 ENCOUNTER — HEALTH MAINTENANCE LETTER (OUTPATIENT)
Age: 40
End: 2025-03-15

## (undated) RX ORDER — LIDOCAINE HYDROCHLORIDE 10 MG/ML
INJECTION, SOLUTION EPIDURAL; INFILTRATION; INTRACAUDAL; PERINEURAL
Status: DISPENSED
Start: 2023-10-23

## (undated) RX ORDER — BETAMETHASONE SODIUM PHOSPHATE AND BETAMETHASONE ACETATE 3; 3 MG/ML; MG/ML
INJECTION, SUSPENSION INTRA-ARTICULAR; INTRALESIONAL; INTRAMUSCULAR; SOFT TISSUE
Status: DISPENSED
Start: 2023-10-23